# Patient Record
Sex: FEMALE | Race: OTHER | NOT HISPANIC OR LATINO
[De-identification: names, ages, dates, MRNs, and addresses within clinical notes are randomized per-mention and may not be internally consistent; named-entity substitution may affect disease eponyms.]

---

## 2017-01-09 ENCOUNTER — MEDICATION RENEWAL (OUTPATIENT)
Age: 82
End: 2017-01-09

## 2017-01-10 ENCOUNTER — APPOINTMENT (OUTPATIENT)
Dept: HOME HEALTH SERVICES | Facility: HOME HEALTH | Age: 82
End: 2017-01-10

## 2017-01-10 VITALS
TEMPERATURE: 97.8 F | DIASTOLIC BLOOD PRESSURE: 62 MMHG | OXYGEN SATURATION: 6 % | SYSTOLIC BLOOD PRESSURE: 112 MMHG | RESPIRATION RATE: 16 BRPM | HEART RATE: 86 BPM

## 2017-01-11 ENCOUNTER — MEDICATION RENEWAL (OUTPATIENT)
Age: 82
End: 2017-01-11

## 2017-01-19 ENCOUNTER — APPOINTMENT (OUTPATIENT)
Dept: HOME HEALTH SERVICES | Facility: HOME HEALTH | Age: 82
End: 2017-01-19

## 2017-01-26 ENCOUNTER — APPOINTMENT (OUTPATIENT)
Dept: HOME HEALTH SERVICES | Facility: HOME HEALTH | Age: 82
End: 2017-01-26

## 2017-01-26 DIAGNOSIS — E87.5 HYPERKALEMIA: ICD-10-CM

## 2017-01-26 DIAGNOSIS — M62.838 OTHER MUSCLE SPASM: ICD-10-CM

## 2017-01-27 ENCOUNTER — LABORATORY RESULT (OUTPATIENT)
Age: 82
End: 2017-01-27

## 2017-01-27 VITALS
SYSTOLIC BLOOD PRESSURE: 108 MMHG | HEART RATE: 78 BPM | OXYGEN SATURATION: 95 % | DIASTOLIC BLOOD PRESSURE: 62 MMHG | RESPIRATION RATE: 14 BRPM | TEMPERATURE: 98 F

## 2017-02-08 ENCOUNTER — MEDICATION RENEWAL (OUTPATIENT)
Age: 82
End: 2017-02-08

## 2017-02-17 ENCOUNTER — APPOINTMENT (OUTPATIENT)
Dept: HOME HEALTH SERVICES | Facility: HOME HEALTH | Age: 82
End: 2017-02-17

## 2017-02-17 VITALS
OXYGEN SATURATION: 96 % | TEMPERATURE: 98.8 F | SYSTOLIC BLOOD PRESSURE: 112 MMHG | HEART RATE: 82 BPM | RESPIRATION RATE: 16 BRPM | DIASTOLIC BLOOD PRESSURE: 68 MMHG

## 2017-02-21 ENCOUNTER — MEDICATION RENEWAL (OUTPATIENT)
Age: 82
End: 2017-02-21

## 2017-03-07 ENCOUNTER — MEDICATION RENEWAL (OUTPATIENT)
Age: 82
End: 2017-03-07

## 2017-03-13 ENCOUNTER — MEDICATION RENEWAL (OUTPATIENT)
Age: 82
End: 2017-03-13

## 2017-03-14 ENCOUNTER — RX RENEWAL (OUTPATIENT)
Age: 82
End: 2017-03-14

## 2017-03-27 ENCOUNTER — RX RENEWAL (OUTPATIENT)
Age: 82
End: 2017-03-27

## 2017-04-04 ENCOUNTER — MEDICATION RENEWAL (OUTPATIENT)
Age: 82
End: 2017-04-04

## 2017-04-04 DIAGNOSIS — N39.0 URINARY TRACT INFECTION, SITE NOT SPECIFIED: ICD-10-CM

## 2017-04-10 ENCOUNTER — MEDICATION RENEWAL (OUTPATIENT)
Age: 82
End: 2017-04-10

## 2017-04-20 ENCOUNTER — MEDICATION RENEWAL (OUTPATIENT)
Age: 82
End: 2017-04-20

## 2017-04-24 ENCOUNTER — MEDICATION RENEWAL (OUTPATIENT)
Age: 82
End: 2017-04-24

## 2017-05-04 ENCOUNTER — APPOINTMENT (OUTPATIENT)
Dept: HOME HEALTH SERVICES | Facility: HOME HEALTH | Age: 82
End: 2017-05-04

## 2017-05-04 VITALS
RESPIRATION RATE: 18 BRPM | SYSTOLIC BLOOD PRESSURE: 126 MMHG | OXYGEN SATURATION: 91 % | DIASTOLIC BLOOD PRESSURE: 64 MMHG | TEMPERATURE: 97.5 F | HEART RATE: 80 BPM

## 2017-05-04 DIAGNOSIS — K59.09 OTHER CONSTIPATION: ICD-10-CM

## 2017-05-04 DIAGNOSIS — R13.19 OTHER DYSPHAGIA: ICD-10-CM

## 2017-05-04 DIAGNOSIS — E03.9 HYPOTHYROIDISM, UNSPECIFIED: ICD-10-CM

## 2017-05-08 ENCOUNTER — MEDICATION RENEWAL (OUTPATIENT)
Age: 82
End: 2017-05-08

## 2017-05-15 ENCOUNTER — MEDICATION RENEWAL (OUTPATIENT)
Age: 82
End: 2017-05-15

## 2017-05-22 ENCOUNTER — MEDICATION RENEWAL (OUTPATIENT)
Age: 82
End: 2017-05-22

## 2017-06-26 ENCOUNTER — MEDICATION RENEWAL (OUTPATIENT)
Age: 82
End: 2017-06-26

## 2017-07-13 ENCOUNTER — MEDICATION RENEWAL (OUTPATIENT)
Age: 82
End: 2017-07-13

## 2017-07-24 ENCOUNTER — MEDICATION RENEWAL (OUTPATIENT)
Age: 82
End: 2017-07-24

## 2017-07-24 ENCOUNTER — CLINICAL ADVICE (OUTPATIENT)
Age: 82
End: 2017-07-24

## 2017-07-24 DIAGNOSIS — G93.41 METABOLIC ENCEPHALOPATHY: ICD-10-CM

## 2017-08-08 ENCOUNTER — CLINICAL ADVICE (OUTPATIENT)
Age: 82
End: 2017-08-08

## 2017-08-08 RX ORDER — LEVOFLOXACIN 500 MG/1
500 TABLET, FILM COATED ORAL DAILY
Qty: 7 | Refills: 0 | Status: ACTIVE | COMMUNITY
Start: 2017-04-04 | End: 1900-01-01

## 2017-08-24 ENCOUNTER — MEDICATION RENEWAL (OUTPATIENT)
Age: 82
End: 2017-08-24

## 2017-09-21 ENCOUNTER — APPOINTMENT (OUTPATIENT)
Dept: HOME HEALTH SERVICES | Facility: HOME HEALTH | Age: 82
End: 2017-09-21
Payer: MEDICARE

## 2017-09-21 VITALS
HEART RATE: 62 BPM | OXYGEN SATURATION: 90 % | RESPIRATION RATE: 16 BRPM | SYSTOLIC BLOOD PRESSURE: 112 MMHG | DIASTOLIC BLOOD PRESSURE: 60 MMHG

## 2017-09-21 DIAGNOSIS — R45.1 RESTLESSNESS AND AGITATION: ICD-10-CM

## 2017-09-21 DIAGNOSIS — J06.9 ACUTE UPPER RESPIRATORY INFECTION, UNSPECIFIED: ICD-10-CM

## 2017-09-21 DIAGNOSIS — J45.909 UNSPECIFIED ASTHMA, UNCOMPLICATED: ICD-10-CM

## 2017-09-21 DIAGNOSIS — R64 CACHEXIA: ICD-10-CM

## 2017-09-21 DIAGNOSIS — R32 UNSPECIFIED URINARY INCONTINENCE: ICD-10-CM

## 2017-09-21 DIAGNOSIS — F03.90 UNSPECIFIED DEMENTIA W/OUT BEHAVIORAL DISTURBANCE: ICD-10-CM

## 2017-09-21 PROCEDURE — 99349 HOME/RES VST EST MOD MDM 40: CPT

## 2017-09-25 PROBLEM — R32 URINARY INCONTINENCE IN FEMALE: Status: ACTIVE | Noted: 2017-09-25

## 2017-10-02 ENCOUNTER — MEDICATION RENEWAL (OUTPATIENT)
Age: 82
End: 2017-10-02

## 2017-10-16 ENCOUNTER — MEDICATION RENEWAL (OUTPATIENT)
Age: 82
End: 2017-10-16

## 2017-10-30 ENCOUNTER — MEDICATION RENEWAL (OUTPATIENT)
Age: 82
End: 2017-10-30

## 2017-10-30 RX ORDER — TRAMADOL HYDROCHLORIDE 50 MG/1
50 TABLET, COATED ORAL
Qty: 120 | Refills: 0 | Status: ACTIVE | COMMUNITY
Start: 2017-07-24 | End: 1900-01-01

## 2017-11-13 ENCOUNTER — MEDICATION RENEWAL (OUTPATIENT)
Age: 82
End: 2017-11-13

## 2017-11-13 ENCOUNTER — CLINICAL ADVICE (OUTPATIENT)
Age: 82
End: 2017-11-13

## 2017-11-28 ENCOUNTER — CLINICAL ADVICE (OUTPATIENT)
Age: 82
End: 2017-11-28

## 2017-11-28 ENCOUNTER — INPATIENT (INPATIENT)
Facility: HOSPITAL | Age: 82
LOS: 10 days | End: 2017-12-09
Attending: INTERNAL MEDICINE | Admitting: INTERNAL MEDICINE
Payer: MEDICARE

## 2017-11-28 VITALS
RESPIRATION RATE: 17 BRPM | HEART RATE: 88 BPM | DIASTOLIC BLOOD PRESSURE: 49 MMHG | OXYGEN SATURATION: 100 % | SYSTOLIC BLOOD PRESSURE: 124 MMHG | TEMPERATURE: 101 F

## 2017-11-28 LAB
ALBUMIN SERPL ELPH-MCNC: 3.7 G/DL — SIGNIFICANT CHANGE UP (ref 3.3–5)
ALP SERPL-CCNC: 98 U/L — SIGNIFICANT CHANGE UP (ref 40–120)
ALT FLD-CCNC: 20 U/L — SIGNIFICANT CHANGE UP (ref 4–33)
APPEARANCE UR: SIGNIFICANT CHANGE UP
APTT BLD: 28.6 SEC — SIGNIFICANT CHANGE UP (ref 27.5–37.4)
AST SERPL-CCNC: 18 U/L — SIGNIFICANT CHANGE UP (ref 4–32)
B PERT DNA SPEC QL NAA+PROBE: SIGNIFICANT CHANGE UP
BACTERIA # UR AUTO: HIGH
BASE EXCESS BLDV CALC-SCNC: -12.3 MMOL/L — SIGNIFICANT CHANGE UP
BASOPHILS NFR BLD AUTO: 0.2 % — SIGNIFICANT CHANGE UP (ref 0–2)
BILIRUB SERPL-MCNC: 0.3 MG/DL — SIGNIFICANT CHANGE UP (ref 0.2–1.2)
BILIRUB UR-MCNC: NEGATIVE — SIGNIFICANT CHANGE UP
BLOOD GAS VENOUS - CREATININE: 3.14 MG/DL — HIGH (ref 0.5–1.3)
BLOOD UR QL VISUAL: HIGH
BUN SERPL-MCNC: 149 MG/DL — HIGH (ref 7–23)
C PNEUM DNA SPEC QL NAA+PROBE: NOT DETECTED — SIGNIFICANT CHANGE UP
CALCIUM SERPL-MCNC: 8.9 MG/DL — SIGNIFICANT CHANGE UP (ref 8.4–10.5)
CHLORIDE BLDV-SCNC: 111 MMOL/L — HIGH (ref 96–108)
CHLORIDE SERPL-SCNC: 104 MMOL/L — SIGNIFICANT CHANGE UP (ref 98–107)
CK MB BLD-MCNC: 5.69 NG/ML — HIGH (ref 1–4.7)
CK SERPL-CCNC: 101 U/L — SIGNIFICANT CHANGE UP (ref 25–170)
CO2 SERPL-SCNC: 12 MMOL/L — LOW (ref 22–31)
COLOR SPEC: SIGNIFICANT CHANGE UP
CREAT SERPL-MCNC: 3.07 MG/DL — HIGH (ref 0.5–1.3)
EOSINOPHIL NFR BLD AUTO: 0 % — SIGNIFICANT CHANGE UP (ref 0–6)
FLUAV H1 2009 PAND RNA SPEC QL NAA+PROBE: NOT DETECTED — SIGNIFICANT CHANGE UP
FLUAV H1 RNA SPEC QL NAA+PROBE: NOT DETECTED — SIGNIFICANT CHANGE UP
FLUAV H3 RNA SPEC QL NAA+PROBE: NOT DETECTED — SIGNIFICANT CHANGE UP
FLUAV SUBTYP SPEC NAA+PROBE: SIGNIFICANT CHANGE UP
FLUBV RNA SPEC QL NAA+PROBE: NOT DETECTED — SIGNIFICANT CHANGE UP
GAS PNL BLDV: 139 MMOL/L — SIGNIFICANT CHANGE UP (ref 136–146)
GLUCOSE BLDV-MCNC: 302 — HIGH (ref 70–99)
GLUCOSE SERPL-MCNC: 338 MG/DL — HIGH (ref 70–99)
GLUCOSE UR-MCNC: 100 — SIGNIFICANT CHANGE UP
HADV DNA SPEC QL NAA+PROBE: NOT DETECTED — SIGNIFICANT CHANGE UP
HCO3 BLDV-SCNC: 15 MMOL/L — LOW (ref 20–27)
HCOV 229E RNA SPEC QL NAA+PROBE: NOT DETECTED — SIGNIFICANT CHANGE UP
HCOV HKU1 RNA SPEC QL NAA+PROBE: NOT DETECTED — SIGNIFICANT CHANGE UP
HCOV NL63 RNA SPEC QL NAA+PROBE: NOT DETECTED — SIGNIFICANT CHANGE UP
HCOV OC43 RNA SPEC QL NAA+PROBE: NOT DETECTED — SIGNIFICANT CHANGE UP
HCT VFR BLD CALC: 29.9 % — LOW (ref 34.5–45)
HCT VFR BLDV CALC: 30.6 % — LOW (ref 34.5–45)
HGB BLD-MCNC: 10.1 G/DL — LOW (ref 11.5–15.5)
HGB BLDV-MCNC: 9.9 G/DL — LOW (ref 11.5–15.5)
HMPV RNA SPEC QL NAA+PROBE: NOT DETECTED — SIGNIFICANT CHANGE UP
HPIV1 RNA SPEC QL NAA+PROBE: NOT DETECTED — SIGNIFICANT CHANGE UP
HPIV2 RNA SPEC QL NAA+PROBE: NOT DETECTED — SIGNIFICANT CHANGE UP
HPIV3 RNA SPEC QL NAA+PROBE: NOT DETECTED — SIGNIFICANT CHANGE UP
HPIV4 RNA SPEC QL NAA+PROBE: NOT DETECTED — SIGNIFICANT CHANGE UP
IMM GRANULOCYTES NFR BLD AUTO: 1.2 % — SIGNIFICANT CHANGE UP (ref 0–1.5)
INR BLD: 1.12 — SIGNIFICANT CHANGE UP (ref 0.88–1.17)
KETONES UR-MCNC: NEGATIVE — SIGNIFICANT CHANGE UP
LACTATE BLDV-MCNC: 1.8 MMOL/L — SIGNIFICANT CHANGE UP (ref 0.5–2)
LEUKOCYTE ESTERASE UR-ACNC: HIGH
LYMPHOCYTES # BLD AUTO: 1.5 % — LOW (ref 13–44)
M PNEUMO DNA SPEC QL NAA+PROBE: NOT DETECTED — SIGNIFICANT CHANGE UP
MCHC RBC-ENTMCNC: 32.9 PG — SIGNIFICANT CHANGE UP (ref 27–34)
MCHC RBC-ENTMCNC: 33.8 % — SIGNIFICANT CHANGE UP (ref 32–36)
MCV RBC AUTO: 97.4 FL — SIGNIFICANT CHANGE UP (ref 80–100)
MONOCYTES NFR BLD AUTO: 6.1 % — SIGNIFICANT CHANGE UP (ref 2–14)
MUCOUS THREADS # UR AUTO: SIGNIFICANT CHANGE UP
NEUTROPHILS NFR BLD AUTO: 91 % — HIGH (ref 43–77)
NITRITE UR-MCNC: NEGATIVE — SIGNIFICANT CHANGE UP
PCO2 BLDV: 31 MMHG — LOW (ref 41–51)
PH BLDV: 7.26 PH — LOW (ref 7.32–7.43)
PH UR: 5.5 — SIGNIFICANT CHANGE UP (ref 4.6–8)
PLATELET # BLD AUTO: 172 K/UL — SIGNIFICANT CHANGE UP (ref 150–400)
PMV BLD: 12.6 FL — SIGNIFICANT CHANGE UP (ref 7–13)
PO2 BLDV: 53 MMHG — HIGH (ref 35–40)
POTASSIUM BLDV-SCNC: 4.1 MMOL/L — SIGNIFICANT CHANGE UP (ref 3.4–4.5)
POTASSIUM SERPL-MCNC: 4.2 MMOL/L — SIGNIFICANT CHANGE UP (ref 3.5–5.3)
POTASSIUM SERPL-SCNC: 4.2 MMOL/L — SIGNIFICANT CHANGE UP (ref 3.5–5.3)
PROT SERPL-MCNC: 7.7 G/DL — SIGNIFICANT CHANGE UP (ref 6–8.3)
PROT UR-MCNC: 100 — HIGH
PROTHROM AB SERPL-ACNC: 12.6 SEC — SIGNIFICANT CHANGE UP (ref 9.8–13.1)
RBC # BLD: 3.07 M/UL — LOW (ref 3.8–5.2)
RBC # FLD: 13.5 % — SIGNIFICANT CHANGE UP (ref 10.3–14.5)
RSV RNA SPEC QL NAA+PROBE: NOT DETECTED — SIGNIFICANT CHANGE UP
RV+EV RNA SPEC QL NAA+PROBE: NOT DETECTED — SIGNIFICANT CHANGE UP
SAO2 % BLDV: 80.5 % — SIGNIFICANT CHANGE UP (ref 60–85)
SODIUM SERPL-SCNC: 141 MMOL/L — SIGNIFICANT CHANGE UP (ref 135–145)
SP GR SPEC: 1.01 — SIGNIFICANT CHANGE UP (ref 1–1.03)
TROPONIN T SERPL-MCNC: 0.2 NG/ML — HIGH (ref 0–0.06)
UROBILINOGEN FLD QL: NORMAL E.U. — SIGNIFICANT CHANGE UP (ref 0.1–0.2)
WBC # BLD: 26.27 K/UL — HIGH (ref 3.8–10.5)
WBC # FLD AUTO: 26.27 K/UL — HIGH (ref 3.8–10.5)
WBC UR QL: >50 — HIGH (ref 0–?)

## 2017-11-28 PROCEDURE — 70490 CT SOFT TISSUE NECK W/O DYE: CPT | Mod: 26

## 2017-11-28 PROCEDURE — 71010: CPT | Mod: 26

## 2017-11-28 RX ORDER — SODIUM CHLORIDE 9 MG/ML
1000 INJECTION INTRAMUSCULAR; INTRAVENOUS; SUBCUTANEOUS ONCE
Qty: 0 | Refills: 0 | Status: COMPLETED | OUTPATIENT
Start: 2017-11-28 | End: 2017-11-28

## 2017-11-28 RX ORDER — AZTREONAM 2 G
1000 VIAL (EA) INJECTION ONCE
Qty: 0 | Refills: 0 | Status: DISCONTINUED | OUTPATIENT
Start: 2017-11-28 | End: 2017-11-28

## 2017-11-28 RX ORDER — VANCOMYCIN HCL 1 G
1000 VIAL (EA) INTRAVENOUS ONCE
Qty: 0 | Refills: 0 | Status: COMPLETED | OUTPATIENT
Start: 2017-11-28 | End: 2017-11-28

## 2017-11-28 RX ORDER — ACETAMINOPHEN 500 MG
1000 TABLET ORAL ONCE
Qty: 0 | Refills: 0 | Status: COMPLETED | OUTPATIENT
Start: 2017-11-28 | End: 2017-11-28

## 2017-11-28 RX ORDER — IMIPENEM AND CILASTATIN 250; 250 MG/100ML; MG/100ML
500 INJECTION, POWDER, FOR SOLUTION INTRAVENOUS ONCE
Qty: 0 | Refills: 0 | Status: COMPLETED | OUTPATIENT
Start: 2017-11-28 | End: 2017-11-28

## 2017-11-28 RX ORDER — LEVOFLOXACIN 500 MG/1
500 TABLET, FILM COATED ORAL DAILY
Qty: 5 | Refills: 0 | Status: ACTIVE | COMMUNITY
Start: 2017-11-28 | End: 1900-01-01

## 2017-11-28 RX ORDER — MEROPENEM 1 G/30ML
1000 INJECTION INTRAVENOUS ONCE
Qty: 0 | Refills: 0 | Status: DISCONTINUED | OUTPATIENT
Start: 2017-11-28 | End: 2017-11-28

## 2017-11-28 RX ORDER — DEXAMETHASONE 0.5 MG/5ML
10 ELIXIR ORAL ONCE
Qty: 0 | Refills: 0 | Status: COMPLETED | OUTPATIENT
Start: 2017-11-28 | End: 2017-11-28

## 2017-11-28 RX ADMIN — SODIUM CHLORIDE 1000 MILLILITER(S): 9 INJECTION INTRAMUSCULAR; INTRAVENOUS; SUBCUTANEOUS at 23:27

## 2017-11-28 RX ADMIN — Medication 250 MILLIGRAM(S): at 17:32

## 2017-11-28 RX ADMIN — SODIUM CHLORIDE 1000 MILLILITER(S): 9 INJECTION INTRAMUSCULAR; INTRAVENOUS; SUBCUTANEOUS at 17:25

## 2017-11-28 RX ADMIN — Medication 400 MILLIGRAM(S): at 16:26

## 2017-11-28 RX ADMIN — IMIPENEM AND CILASTATIN 100 MILLIGRAM(S): 250; 250 INJECTION, POWDER, FOR SOLUTION INTRAVENOUS at 17:00

## 2017-11-28 RX ADMIN — Medication 102 MILLIGRAM(S): at 23:26

## 2017-11-28 NOTE — ED ADULT TRIAGE NOTE - CHIEF COMPLAINT QUOTE
Pt BIBEMS as notification for increased lethargy, responsive only to pain, and swelling to L face. Pt febrile on arrival. Direct to room.

## 2017-11-28 NOTE — ED ADULT NURSE NOTE - ED STAT RN HANDOFF DETAILS
report given to SICU RN, Marlen, pt in NAD, awaiting EDT and float nurse for transport, will continue to monitor pt.

## 2017-11-28 NOTE — ED PROVIDER NOTE - MEDICAL DECISION MAKING DETAILS
89yo female with decreased responsiveness and sepsis, labs, cx, abx, admit 89f, h/o dementia, nonverbal at baseline, dx with uti as outpatient, febrile, new left facial swelling, will check labs, cxr, ua, fluids, abx, ct, reass.

## 2017-11-28 NOTE — ED PROVIDER NOTE - SKIN, MLM
Skin normal color for race, warm, dry and intact.left sided facial swelling, redness, induration and warmth

## 2017-11-28 NOTE — CONSULT NOTE ADULT - ASSESSMENT
89F with possible Sepsis secondary to UTI and Left Parotid/Submandibular swelling with paratracheal and possible epiglottic involvement, PNA      Recommendations:  -no indication for ICU level of care at this time. patient is hemodynamically stable, respiratory status is stable.  -CT neck and CXR noted  -UA suggestive of UTI  -ENT eval for possible epiglottitis   -treat underlying infections with broad spectrum abx; pt received IV vanco and imipenem in ED  -IVF hydration  -Decadron   -supplemental O2 prn  -f/u all cultures  -d/w Dr. Montiel and Dr. Newell

## 2017-11-28 NOTE — ED ADULT NURSE REASSESSMENT NOTE - NS ED NURSE REASSESS COMMENT FT1
report received at shift change, pt remains at baseline mental status, nonverbal, family remains at bedside, updated by MD with CT read and plan, pt awaiting MICU/ENT consult. VS as noted, pt in NAD, will continue to monitor pt.

## 2017-11-28 NOTE — ED PROVIDER NOTE - PMH
Breast cancer    CKD (chronic kidney disease)    Dementia    Hypothyroid    UTI (urinary tract infection) due to Enterococcus

## 2017-11-28 NOTE — CONSULT NOTE ADULT - ATTENDING COMMENTS
patient seen and examined at bedside. Pt with hx severe dementia, nonverbal and nonmobile,  presents with facial swelling on the left, pos. ua and opacity on cxr . ct scan of  face reviewed,  pt with tissue swelling, mild epiglottis involvement and or posterior effusion, On 4 liters nasal  cannula o2 sat 100%, bp 120/80p 90 rr18 heent dry mucosa lungs clear, heart s1s2  abdomen nontender,  labs noted  likely sepsis  Pt able to protect airway, suggest ent evaluation, decadron, iv abx    pt does not require icu level care, patient seen and examined at bedside. Pt with hx severe dementia, nonverbal and nonmobile,  presents with facial swelling on the left, pos. ua and opacity on cxr . ct scan of  face reviewed,  pt with tissue swelling, mild epiglottis involvement and or posterior effusion, On 4 liters nasal  cannula o2 sat 100%, bp 120/80p 90 rr18 heent dry mucosa lungs clear, heart s1s2  abdomen nontender,  labs noted  likely sepsis  pt on nasal cannula, , decadron, iv abx    pt does not require medical icu  .  suggest  ENT evaluation for possible drainage .

## 2017-11-28 NOTE — ED PROVIDER NOTE - CARE PLAN
Principal Discharge DX:	Supraglottic edema Principal Discharge DX:	Supraglottic edema  Secondary Diagnosis:	Urinary tract infection without hematuria, site unspecified

## 2017-11-28 NOTE — ED ADULT NURSE NOTE - OBJECTIVE STATEMENT
Pt rcvd to Tr A for increased lethargy, L facial swelling, & fever. As per EMS and Aide, pt is confused at baseline, but today was more lethargic, responsive only to painful stimuli and febrile. Family also began to notice L facial swelling. + edema observed to L face, area warm to touch & reddened. Aide states a hx of UTI, currently on antibx at home. Pt is contracted, multiple small hematomas observed to L arm. Arrives with 20g IV to L forearm by EMS, given 1L NS by EMS en route. 20g IV placed to R wrist in ED. Blanchable redness observed to sacral area. Evaluated by MD. Labs drawn & sent. Will continue to monitor.

## 2017-11-28 NOTE — ED PROVIDER NOTE - PROGRESS NOTE DETAILS
not micu candidate, awaiting ENT eval shashi: seen by ent, concern for airway swelling, will consult SICU [place]: Pt slightly tachypneic w/ stridor. satting 100%. pt at baseline non-communicative. daugthers at bedside, they state pt appears the same as over the last hr and not worse. SICU at bedside as well. Will reassess,

## 2017-11-28 NOTE — ED PROVIDER NOTE - OBJECTIVE STATEMENT
89yo F h/o bedbound, endstage dementia, CKD (baseline 1.75) hypothyroidism presents from home with decreased responsiveness, fever and left sided facial swelling. Pt was dxed with UTI yesterday and started on levaquin. Also with cough today.   Pt brought to trauma A as notification. On arrival pt nonverbal but responsive to painful stimuli, protecting airway.

## 2017-11-28 NOTE — ED PROVIDER NOTE - ATTENDING CONTRIBUTION TO CARE
[facility]- 89 y/o f, h/o ckd, hypothyroid, dementia (nonverbal/bedbound at baseline) presents for left facial swelling, fevers, just started levaquin for uti. Per daughter (daisy who I spoke with via phone) and HHA at bedside, patient was dx with uti and started on levaquin for treatment. Today they noticed the left side of her face was becoming progressively more swollen and red. +fevers at home. No respiratory distress noted by aid. MS currently at baseline per hha though was less responsive earlier.  exam  GEN - eyes closed, intermittently makes high pitched repetitive sounds; ao0, nonverbal, responsive to tactile stimuli  EYES- clear conjunctiva  ENT: dry mm, limited view of posterior ooropharynx as patient not able to cooperate with exam but no obvious lesions noted, poor dentition but no region of fluctuance or drainage apparent,  +edema/erythema extending from preauricular region of left face/cheek/upper neck, no focal region of fluctuance. No drooling noted, protecting airway.  NECK: supple  PULMONARY - symmetric clear breath sounds.   CARDIAC -s1s2, RRR, no M,G,R  ABDOMEN - +BS, ND, soft, no guarding, no masses  BACK - Normal  spine   EXTREMITIES - contracted extremities, capillary refill < 2 seconds, no edema   SKIN - no rash   NEUROLOGIC - ao0, intermittently makes high pitched noises, does not follow commands. (baseline per aid  a/p-89f, h/o dementia, ckd, hypothyroid, recently diagnosed with uti started on levaquin, now with left facial swelling, appears to be protecting airway right now, ms @ baseline per hha, febrile, will check labs, cxr, ua, ct eval of left facial swelling (will likely be noncon pending cr results given h/o ckd), hydrate, broad spectrum abx coverage, careful observation, and reass.  I had a phone conversation with daughter Daisy who per hha and herself is the patients hcp, as per the molst form, daisy reaffirmed that she would like everything to be done for the patient and that she is to be full code.

## 2017-11-28 NOTE — CONSULT NOTE ADULT - SUBJECTIVE AND OBJECTIVE BOX
CHIEF COMPLAINT: fever and left facial swelling    HPI: 89F with end stage dementia, CKD Stage 3 (baseline Cr 1.75), hypothyroidism, frequent UTIs p/w left facial swelling since this AM. Daughter at bedside provides hx. She noted patient felt warm today and left her left cheek was swollen. Patient had eaten today, otherwise seemed fine, but les responsive later in the day. Patient was recently dx with UTI yesterday and started on levafloxacin. Patient unable to contribute to HPI secondary to dementia. At baseline patient is mainly non-verbal (will make high pitched sounds when in pain), keeps eyes closed, contracted and bed bound, lives with daughter.     PAST MEDICAL & SURGICAL HISTORY:  UTI (urinary tract infection) due to Enterococcus  CKD (chronic kidney disease)  Breast cancer  Hypothyroid  Dementia      FAMILY HISTORY:  No pertinent family history in first degree relatives      SOCIAL HISTORY:  Smoking: [ ] Never Smoked [ ] Former Smoker (__ packs x ___ years) [ ] Current Smoker  (__ packs x ___ years) [x] unknown  Substance Use: [ ] Never Used [ ] Used ____  EtOH Use:  Marital Status: [ ] Single [ ]  [ ]  [ ]       Allergies: No Known Allergies    Intolerances      HOME MEDICATIONS:  Home Medications:  ciprofloxacin 250 mg oral tablet: 1 tab(s) orally 2 times a day (18 Sep 2015 17:27)  citalopram 40 mg oral tablet: 1 tab(s) orally once a day (14 Sep 2015 15:10)  Colace sodium 100 mg oral capsule: 2 cap(s) orally 2 times a day (14 Sep 2015 15:10)  folic acid 1 mg oral tablet: 1 tab(s) orally once a day (18 Sep 2015 17:27)  lactulose 10 g oral powder for reconstitution: 1 dose(s) orally once a day (18 Sep 2015 18:03)  levalbuterol 1.25 mg/3 mL inhalation solution: 3 milliliter(s) inhaled 2 times a day, As Needed (19 Sep 2015 08:59)  LORazepam 0.5 mg oral tablet: 1 tab(s) orally 3 times a day, As Needed (14 Sep 2015 15:10)  Namenda 10 mg oral tablet: 1 tab(s) orally 2 times a day (14 Sep 2015 15:10)  polyethylene glycol 3350 oral powder for reconstitution: 17 gram(s) orally 2 times a day (14 Sep 2015 15:10)  senna oral tablet: 2 tab(s) orally once a day (at bedtime) (14 Sep 2015 15:10)  Synthroid 75 mcg (0.075 mg) oral tablet: 1 tab(s) orally once a day (14 Sep 2015 15:10)  Tylenol 500 mg oral tablet: 2 tab(s) orally every 6 hours, As Needed (14 Sep 2015 15:10)      REVIEW OF SYSTEMS:  Constitutional: [ ] negative [ ] fevers [ ] chills [ ] weight loss [ ] weight gain  HEENT: [ ] negative [ ] dry eyes [ ] eye irritation [ ] postnasal drip [ ] nasal congestion  CV: [ ] negative  [ ] chest pain [ ] orthopnea [ ] palpitations [ ] murmur  Resp: [ ] negative [ ] cough [ ] shortness of breath [ ] dyspnea [ ] wheezing [ ] sputum [ ] hemoptysis  GI: [ ] negative [ ] nausea [ ] vomiting [ ] diarrhea [ ] constipation [ ] abd pain [ ] dysphagia   : [ ] negative [ ] dysuria [ ] nocturia [ ] hematuria [ ] increased urinary frequency  Musculoskeletal: [ ] negative [ ] back pain [ ] myalgias [ ] arthralgias [ ] fracture  Skin: [ ] negative [ ] rash [ ] itch  Neurological: [ ] negative [ ] headache [ ] dizziness [ ] syncope [ ] weakness [ ] numbness  Psychiatric: [ ] negative [ ] anxiety [ ] depression  Endocrine: [ ] negative [ ] diabetes [ ] thyroid problem  Hematologic/Lymphatic: [ ] negative [ ] anemia [ ] bleeding problem  Allergic/Immunologic: [ ] negative [ ] itchy eyes [ ] nasal discharge [ ] hives [ ] angioedema  [ ] All other systems negative  [x ] Unable to assess ROS secondary to dementia    OBJECTIVE:  T(C): 37.8 (2017 21:45), Max: 38.1 (2017 16:17)  T(F): 100.1 (2017 21:45), Max: 100.5 (2017 16:17)  HR: 91 (2017 21:45) (82 - 91)  BP: 110/47 (2017 21:45) (110/47 - 124/49)  BP(mean): 60 (2017 21:45) (60 - 60)  ABP: --  ABP(mean): --  RR: 16 (2017 21:45) (15 - 17)  SpO2: 99% (2017 21:45) (99% - 100%)        PHYSICAL EXAM:   General: NAD, resting comfortably with eyes closed, contracted extremities  HEENT: eyes closed, does not allow to open. Dry oral mucosa. left facial swelling over lower face with erythema and tenderness to palpation  Lymph Nodes:  Neck: supple  Respiratory: lungs clear to ausculation bilaterally. Good air entry. no WRR  Cardiovascular: (+) S1S2, (+) systolic murmur grade 3/6, regular rhythm  Abdomen: soft, distended, (+) BS  Extremities: contracted hands b/l and RLE. no edema noted  Skin: facial cellulitis as above  Neurological: AAOx0, no purposeful interaction  Psychiatry: unable to assess    LINES: [facility] MEDICATIONS:  Standing Meds:  sodium chloride 0.9% Bolus 1000 milliLiter(s) IV Bolus once      PRN Meds:      LABS:                        10.1   26.27 )-----------( 172      ( 2017 16:06 )             29.9     Hgb Trend: 10.1<--      141  |  104  |  149<H>  ----------------------------<  338<H>  4.2   |  12<L>  |  3.07<H>    Ca    8.9      2017 16:06    TPro  7.7  /  Alb  3.7  /  TBili  0.3  /  DBili  x   /  AST  18  /  ALT  20  /  AlkPhos  98      Creatinine Trend: 3.07<--  PT/INR - ( 2017 16:06 )   PT: 12.6 SEC;   INR: 1.12          PTT - ( 2017 16:06 )  PTT:28.6 SEC  Urinalysis Basic - ( 2017 16:53 )    Color: PLYEL / Appearance: HAZY / S.013 / pH: 5.5  Gluc: 100 / Ketone: NEGATIVE  / Bili: NEGATIVE / Urobili: NORMAL E.U.   Blood: SMALL / Protein: 100 / Nitrite: NEGATIVE   Leuk Esterase: LARGE / RBC: x / WBC >50   Sq Epi: x / Non Sq Epi: x / Bacteria: MANY        Venous Blood Gas:   @ 16:06  7.26/31/53/15/80.5  VBG Lactate: 1.8      MICROBIOLOGY:   pending cx      RADIOLOGY:  [x ] Reviewed and interpreted by me    EKG: AYANNA HOROWITZ

## 2017-11-29 ENCOUNTER — APPOINTMENT (OUTPATIENT)
Dept: HOME HEALTH SERVICES | Facility: HOME HEALTH | Age: 82
End: 2017-11-29

## 2017-11-29 DIAGNOSIS — J38.4 EDEMA OF LARYNX: ICD-10-CM

## 2017-11-29 LAB
BASE EXCESS BLDA CALC-SCNC: -14 MMOL/L — SIGNIFICANT CHANGE UP
BUN SERPL-MCNC: 132 MG/DL — HIGH (ref 7–23)
BUN SERPL-MCNC: 137 MG/DL — HIGH (ref 7–23)
CA-I BLDA-SCNC: 1.34 MMOL/L — HIGH (ref 1.15–1.29)
CALCIUM SERPL-MCNC: 10.9 MG/DL — HIGH (ref 8.4–10.5)
CALCIUM SERPL-MCNC: 9.8 MG/DL — SIGNIFICANT CHANGE UP (ref 8.4–10.5)
CHLORIDE SERPL-SCNC: 112 MMOL/L — HIGH (ref 98–107)
CHLORIDE SERPL-SCNC: 112 MMOL/L — HIGH (ref 98–107)
CO2 SERPL-SCNC: 11 MMOL/L — LOW (ref 22–31)
CO2 SERPL-SCNC: SIGNIFICANT CHANGE UP MMOL/L (ref 22–31)
CREAT SERPL-MCNC: 2.56 MG/DL — HIGH (ref 0.5–1.3)
CREAT SERPL-MCNC: 2.59 MG/DL — HIGH (ref 0.5–1.3)
GLUCOSE BLDA-MCNC: 180 MG/DL — HIGH (ref 70–99)
GLUCOSE SERPL-MCNC: 152 MG/DL — HIGH (ref 70–99)
GLUCOSE SERPL-MCNC: 189 MG/DL — HIGH (ref 70–99)
HCO3 BLDA-SCNC: 14 MMOL/L — LOW (ref 22–26)
HCT VFR BLD CALC: 28 % — LOW (ref 34.5–45)
HCT VFR BLDA CALC: 28.5 % — LOW (ref 34.5–46.5)
HGB BLD-MCNC: 9.7 G/DL — LOW (ref 11.5–15.5)
HGB BLDA-MCNC: 9.2 G/DL — LOW (ref 11.5–15.5)
LACTATE BLDA-SCNC: 1 MMOL/L — SIGNIFICANT CHANGE UP (ref 0.5–2)
MAGNESIUM SERPL-MCNC: 2.7 MG/DL — HIGH (ref 1.6–2.6)
MCHC RBC-ENTMCNC: 32.9 PG — SIGNIFICANT CHANGE UP (ref 27–34)
MCHC RBC-ENTMCNC: 34.6 % — SIGNIFICANT CHANGE UP (ref 32–36)
MCV RBC AUTO: 94.9 FL — SIGNIFICANT CHANGE UP (ref 80–100)
METHOD TYPE: SIGNIFICANT CHANGE UP
NRBC # FLD: 0 — SIGNIFICANT CHANGE UP
ORGANISM # SPEC MICROSCOPIC CNT: SIGNIFICANT CHANGE UP
ORGANISM # SPEC MICROSCOPIC CNT: SIGNIFICANT CHANGE UP
PCO2 BLDA: 29 MMHG — LOW (ref 32–48)
PH BLDA: 7.24 PH — LOW (ref 7.35–7.45)
PHOSPHATE SERPL-MCNC: 8.3 MG/DL — HIGH (ref 2.5–4.5)
PLATELET # BLD AUTO: 148 K/UL — LOW (ref 150–400)
PMV BLD: 13.2 FL — HIGH (ref 7–13)
PO2 BLDA: 94 MMHG — SIGNIFICANT CHANGE UP (ref 83–108)
POTASSIUM BLDA-SCNC: 3.4 MMOL/L — SIGNIFICANT CHANGE UP (ref 3.4–4.5)
POTASSIUM SERPL-MCNC: 4 MMOL/L — SIGNIFICANT CHANGE UP (ref 3.5–5.3)
POTASSIUM SERPL-MCNC: 4.7 MMOL/L — SIGNIFICANT CHANGE UP (ref 3.5–5.3)
POTASSIUM SERPL-SCNC: 4 MMOL/L — SIGNIFICANT CHANGE UP (ref 3.5–5.3)
POTASSIUM SERPL-SCNC: 4.7 MMOL/L — SIGNIFICANT CHANGE UP (ref 3.5–5.3)
RBC # BLD: 2.95 M/UL — LOW (ref 3.8–5.2)
RBC # FLD: 13.6 % — SIGNIFICANT CHANGE UP (ref 10.3–14.5)
SAO2 % BLDA: 94.6 % — LOW (ref 95–99)
SODIUM BLDA-SCNC: 142 MMOL/L — SIGNIFICANT CHANGE UP (ref 136–146)
SODIUM SERPL-SCNC: 144 MMOL/L — SIGNIFICANT CHANGE UP (ref 135–145)
SODIUM SERPL-SCNC: 145 MMOL/L — SIGNIFICANT CHANGE UP (ref 135–145)
SPECIMEN SOURCE: SIGNIFICANT CHANGE UP
VANCOMYCIN FLD-MCNC: 7.9 UG/ML — SIGNIFICANT CHANGE UP
WBC # BLD: 24.56 K/UL — HIGH (ref 3.8–10.5)
WBC # FLD AUTO: 24.56 K/UL — HIGH (ref 3.8–10.5)

## 2017-11-29 PROCEDURE — 99291 CRITICAL CARE FIRST HOUR: CPT

## 2017-11-29 PROCEDURE — 71010: CPT | Mod: 26

## 2017-11-29 RX ORDER — DEXAMETHASONE 0.5 MG/5ML
5 ELIXIR ORAL EVERY 6 HOURS
Qty: 0 | Refills: 0 | Status: DISCONTINUED | OUTPATIENT
Start: 2017-11-29 | End: 2017-12-01

## 2017-11-29 RX ORDER — INFLUENZA VIRUS VACCINE 15; 15; 15; 15 UG/.5ML; UG/.5ML; UG/.5ML; UG/.5ML
0.5 SUSPENSION INTRAMUSCULAR ONCE
Qty: 0 | Refills: 0 | Status: DISCONTINUED | OUTPATIENT
Start: 2017-11-29 | End: 2017-12-09

## 2017-11-29 RX ORDER — INSULIN LISPRO 100/ML
VIAL (ML) SUBCUTANEOUS AT BEDTIME
Qty: 0 | Refills: 0 | Status: DISCONTINUED | OUTPATIENT
Start: 2017-11-29 | End: 2017-11-30

## 2017-11-29 RX ORDER — ACETAMINOPHEN 500 MG
650 TABLET ORAL EVERY 6 HOURS
Qty: 0 | Refills: 0 | Status: DISCONTINUED | OUTPATIENT
Start: 2017-11-29 | End: 2017-11-29

## 2017-11-29 RX ORDER — SODIUM CHLORIDE 9 MG/ML
1000 INJECTION, SOLUTION INTRAVENOUS
Qty: 0 | Refills: 0 | Status: DISCONTINUED | OUTPATIENT
Start: 2017-11-29 | End: 2017-11-29

## 2017-11-29 RX ORDER — DOCUSATE SODIUM 100 MG
200 CAPSULE ORAL
Qty: 0 | Refills: 0 | Status: DISCONTINUED | OUTPATIENT
Start: 2017-11-29 | End: 2017-11-29

## 2017-11-29 RX ORDER — DEXTROSE 50 % IN WATER 50 %
12.5 SYRINGE (ML) INTRAVENOUS ONCE
Qty: 0 | Refills: 0 | Status: DISCONTINUED | OUTPATIENT
Start: 2017-11-29 | End: 2017-12-09

## 2017-11-29 RX ORDER — SENNA PLUS 8.6 MG/1
2 TABLET ORAL AT BEDTIME
Qty: 0 | Refills: 0 | Status: DISCONTINUED | OUTPATIENT
Start: 2017-11-29 | End: 2017-11-29

## 2017-11-29 RX ORDER — DEXTROSE 50 % IN WATER 50 %
1 SYRINGE (ML) INTRAVENOUS ONCE
Qty: 0 | Refills: 0 | Status: DISCONTINUED | OUTPATIENT
Start: 2017-11-29 | End: 2017-12-01

## 2017-11-29 RX ORDER — OXYMETAZOLINE HYDROCHLORIDE 0.5 MG/ML
1 SPRAY NASAL EVERY 12 HOURS
Qty: 0 | Refills: 0 | Status: DISCONTINUED | OUTPATIENT
Start: 2017-11-29 | End: 2017-11-30

## 2017-11-29 RX ORDER — ACETAMINOPHEN 500 MG
1000 TABLET ORAL ONCE
Qty: 0 | Refills: 0 | Status: COMPLETED | OUTPATIENT
Start: 2017-11-29 | End: 2017-11-29

## 2017-11-29 RX ORDER — DEXTROSE 50 % IN WATER 50 %
25 SYRINGE (ML) INTRAVENOUS ONCE
Qty: 0 | Refills: 0 | Status: DISCONTINUED | OUTPATIENT
Start: 2017-11-29 | End: 2017-12-01

## 2017-11-29 RX ORDER — SODIUM CHLORIDE 9 MG/ML
1000 INJECTION INTRAMUSCULAR; INTRAVENOUS; SUBCUTANEOUS
Qty: 0 | Refills: 0 | Status: DISCONTINUED | OUTPATIENT
Start: 2017-11-29 | End: 2017-11-29

## 2017-11-29 RX ORDER — GLUCAGON INJECTION, SOLUTION 0.5 MG/.1ML
1 INJECTION, SOLUTION SUBCUTANEOUS ONCE
Qty: 0 | Refills: 0 | Status: DISCONTINUED | OUTPATIENT
Start: 2017-11-29 | End: 2017-12-01

## 2017-11-29 RX ORDER — LACTULOSE 10 G/15ML
10 SOLUTION ORAL DAILY
Qty: 0 | Refills: 0 | Status: DISCONTINUED | OUTPATIENT
Start: 2017-11-29 | End: 2017-11-29

## 2017-11-29 RX ORDER — MEMANTINE HYDROCHLORIDE 10 MG/1
10 TABLET ORAL
Qty: 0 | Refills: 0 | Status: DISCONTINUED | OUTPATIENT
Start: 2017-11-29 | End: 2017-11-29

## 2017-11-29 RX ORDER — VANCOMYCIN HCL 1 G
750 VIAL (EA) INTRAVENOUS ONCE
Qty: 0 | Refills: 0 | Status: COMPLETED | OUTPATIENT
Start: 2017-11-29 | End: 2017-11-29

## 2017-11-29 RX ORDER — AMPICILLIN SODIUM AND SULBACTAM SODIUM 250; 125 MG/ML; MG/ML
1.5 INJECTION, POWDER, FOR SUSPENSION INTRAMUSCULAR; INTRAVENOUS EVERY 24 HOURS
Qty: 0 | Refills: 0 | Status: DISCONTINUED | OUTPATIENT
Start: 2017-11-29 | End: 2017-11-30

## 2017-11-29 RX ORDER — CITALOPRAM 10 MG/1
40 TABLET, FILM COATED ORAL DAILY
Qty: 0 | Refills: 0 | Status: DISCONTINUED | OUTPATIENT
Start: 2017-11-29 | End: 2017-11-29

## 2017-11-29 RX ORDER — HEPARIN SODIUM 5000 [USP'U]/ML
5000 INJECTION INTRAVENOUS; SUBCUTANEOUS EVERY 12 HOURS
Qty: 0 | Refills: 0 | Status: DISCONTINUED | OUTPATIENT
Start: 2017-11-29 | End: 2017-11-30

## 2017-11-29 RX ORDER — SODIUM CHLORIDE 9 MG/ML
1000 INJECTION INTRAMUSCULAR; INTRAVENOUS; SUBCUTANEOUS
Qty: 0 | Refills: 0 | Status: DISCONTINUED | OUTPATIENT
Start: 2017-11-29 | End: 2017-11-30

## 2017-11-29 RX ORDER — LEVOTHYROXINE SODIUM 125 MCG
37 TABLET ORAL DAILY
Qty: 0 | Refills: 0 | Status: DISCONTINUED | OUTPATIENT
Start: 2017-11-29 | End: 2017-12-09

## 2017-11-29 RX ORDER — DEXAMETHASONE 0.5 MG/5ML
5 ELIXIR ORAL EVERY 6 HOURS
Qty: 0 | Refills: 0 | Status: DISCONTINUED | OUTPATIENT
Start: 2017-11-29 | End: 2017-11-29

## 2017-11-29 RX ORDER — SODIUM CHLORIDE 9 MG/ML
1000 INJECTION, SOLUTION INTRAVENOUS
Qty: 0 | Refills: 0 | Status: DISCONTINUED | OUTPATIENT
Start: 2017-11-29 | End: 2017-12-01

## 2017-11-29 RX ORDER — INSULIN LISPRO 100/ML
VIAL (ML) SUBCUTANEOUS EVERY 6 HOURS
Qty: 0 | Refills: 0 | Status: DISCONTINUED | OUTPATIENT
Start: 2017-11-29 | End: 2017-12-01

## 2017-11-29 RX ORDER — POLYETHYLENE GLYCOL 3350 17 G/17G
17 POWDER, FOR SOLUTION ORAL
Qty: 0 | Refills: 0 | Status: DISCONTINUED | OUTPATIENT
Start: 2017-11-29 | End: 2017-11-29

## 2017-11-29 RX ORDER — INSULIN LISPRO 100/ML
VIAL (ML) SUBCUTANEOUS
Qty: 0 | Refills: 0 | Status: DISCONTINUED | OUTPATIENT
Start: 2017-11-29 | End: 2017-11-29

## 2017-11-29 RX ORDER — LEVALBUTEROL 1.25 MG/.5ML
1.25 SOLUTION, CONCENTRATE RESPIRATORY (INHALATION)
Qty: 0 | Refills: 0 | Status: DISCONTINUED | OUTPATIENT
Start: 2017-11-29 | End: 2017-12-09

## 2017-11-29 RX ADMIN — Medication 400 MILLIGRAM(S): at 04:14

## 2017-11-29 RX ADMIN — Medication 5 MILLIGRAM(S): at 18:17

## 2017-11-29 RX ADMIN — HEPARIN SODIUM 5000 UNIT(S): 5000 INJECTION INTRAVENOUS; SUBCUTANEOUS at 18:17

## 2017-11-29 RX ADMIN — Medication 5 MILLIGRAM(S): at 12:28

## 2017-11-29 RX ADMIN — AMPICILLIN SODIUM AND SULBACTAM SODIUM 100 GRAM(S): 250; 125 INJECTION, POWDER, FOR SUSPENSION INTRAMUSCULAR; INTRAVENOUS at 08:37

## 2017-11-29 RX ADMIN — Medication 37 MICROGRAM(S): at 05:16

## 2017-11-29 RX ADMIN — Medication 100 MILLIGRAM(S): at 00:10

## 2017-11-29 RX ADMIN — Medication 5 MILLIGRAM(S): at 06:14

## 2017-11-29 RX ADMIN — SODIUM CHLORIDE 50 MILLILITER(S): 9 INJECTION, SOLUTION INTRAVENOUS at 08:37

## 2017-11-29 RX ADMIN — Medication 5 MILLIGRAM(S): at 23:55

## 2017-11-29 RX ADMIN — SODIUM CHLORIDE 100 MILLILITER(S): 9 INJECTION INTRAMUSCULAR; INTRAVENOUS; SUBCUTANEOUS at 20:11

## 2017-11-29 RX ADMIN — OXYMETAZOLINE HYDROCHLORIDE 1 SPRAY(S): 0.5 SPRAY NASAL at 12:32

## 2017-11-29 RX ADMIN — Medication 1: at 23:54

## 2017-11-29 RX ADMIN — SODIUM CHLORIDE 125 MILLILITER(S): 9 INJECTION INTRAMUSCULAR; INTRAVENOUS; SUBCUTANEOUS at 09:40

## 2017-11-29 RX ADMIN — HEPARIN SODIUM 5000 UNIT(S): 5000 INJECTION INTRAVENOUS; SUBCUTANEOUS at 05:15

## 2017-11-29 RX ADMIN — SODIUM CHLORIDE 30 MILLILITER(S): 9 INJECTION, SOLUTION INTRAVENOUS at 01:34

## 2017-11-29 RX ADMIN — Medication 150 MILLIGRAM(S): at 18:42

## 2017-11-29 RX ADMIN — Medication 400 MILLIGRAM(S): at 21:45

## 2017-11-29 RX ADMIN — Medication 1000 MILLIGRAM(S): at 22:15

## 2017-11-29 RX ADMIN — Medication 1: at 18:40

## 2017-11-29 NOTE — PROGRESS NOTE ADULT - SUBJECTIVE AND OBJECTIVE BOX
Patient seen and rescoped this AM at 8:10 am. Improvement in respiratory status. Breathing comfortably on NC    Phys Exam  O2 100% on NC, RR 15  NAD, non responsive  breathing comfortably, no retractions, no stridor, no stertor  left facial edema and erythema overlying the parotid, left face, and neck  NC nasal mucosa appears dry with crusting, small area of friable mucosa on left nasal septum  Poor compliance with oral exam,  Left oropharynx edema, unable to assess floor of mouth  neck with left SMG tenderness and swelling with overlying skin edema    FOE: NC/NP dry mucosa, posterior oropharynx/BOT very dry oral mucosa, supraglottis and hypopharynx coated with thick yellow secretions limiting FOE examination, epiglottis partially visualized and no significant edema or erythema appreciated, anterior TVC visualized and airway appears patent      A/P 89F with left sides parotitis/sialadenitis with diffuse surrounding inflammatory reaction including supraglottitis  - would limit nasal cannula, as nasal mucosa and upper aerodigestive tract appears very dessicated - recommend humidified face tent  - afrin BID PRN epistaxis  - NPO  - Decadron  - Airway watch  - ICU  - consider intubation if status worsens  - broad abx  - warm compresses and left sided parotid and submandibular gland massage every 2-3hrs  - aggressive hydration  - sialogogues

## 2017-11-29 NOTE — PROGRESS NOTE ADULT - SUBJECTIVE AND OBJECTIVE BOX
Pt seen and examined.  Transferred to SICU for airway watch.  Some improvement in respiratory status.    101.5, slightly tachypneic  non responsive  does not open eyes  tachypneic with inspiratory soft stridor  left facial edema and erythema overlying the parotid, left face, and neck  NC pink moist mucosa  Poor compliance with oral exam,  Left oropharynx edema, unable to assess floor of mouth  neck with left SMG tenderness and swelling with overlying skin edema      A/P 89F with left sides parotitis/sialadenitis with diffuse surrounding inflammatory reaction including supraglottitis  - NPO  - Decadron  - Airway watch  - ICU  - consider intubation if status worsens  - broad abx  - left sided parotid and submandibular gland massage every 1-2 hours  - aggressive hydration  - warm compresses  - sialogogues  - examined with attending

## 2017-11-29 NOTE — PATIENT PROFILE ADULT. - VISION (WITH CORRECTIVE LENSES IF THE PATIENT USUALLY WEARS THEM):
Severely impaired: cannot locate objects without hearing or touching them or patient nonresponsive./Pt unresponsive

## 2017-11-29 NOTE — CONSULT NOTE ADULT - SUBJECTIVE AND OBJECTIVE BOX
SICU Consultation Note  =====================================================  HPI: 89y women with PMH CKD, dementia (non verbal and bed bound), and recurrent UTIs brought in by her daughter (with whom she lives) for concern regarding facial swelling and noisy breathing which started this morning. The patient has been residing with her daughter and is cared for via the [facility] program. She was diagnosed with a UTI the day prior to admission and started on Levaquin. The night prior to the day of admission, the patient was noted to have a Tmax 101. The morning of admission the patients daughters noted erythema and swelling on the left side of the patient's face which progressively increased throughout the day. The swelling increased and the patient's breathing became increasingly stridorous. She underwent CT scan which did not show any abscesses but demonstrated diffuse inflammation of the parotid, salivary glands, and supraglottic edema. In the ED, she was evaluated by MICU who declined to take the patient. She was then evaluated by ENT who found diffuse supraglottic edema and difficulty visualizing the vocal cords.  SICU was consulted by ENT for concern for airway compromise.          PAST MEDICAL & SURGICAL HISTORY:  UTI (urinary tract infection) due to Enterococcus  CKD (chronic kidney disease)  Breast cancer  Hypothyroid  Dementia  No significant past surgical history    Home Meds:    citalopram 40 mg oral tablet: 1 tab(s) orally once a day (14 Sep 2015 15:10)  Colace sodium 100 mg oral capsule: 2 cap(s) orally 2 times a day (14 Sep 2015 15:10)  folic acid 1 mg oral tablet: 1 tab(s) orally once a day (18 Sep 2015 17:27)  lactulose 10 g oral powder for reconstitution: 1 dose(s) orally once a day (18 Sep 2015 18:03)  levalbuterol 1.25 mg/3 mL inhalation solution: 3 milliliter(s) inhaled 2 times a day, As Needed (19 Sep 2015 08:59)  LORazepam 0.5 mg oral tablet: 1 tab(s) orally 3 times a day, As Needed (14 Sep 2015 15:10)  Namenda 10 mg oral tablet: 1 tab(s) orally 2 times a day (14 Sep 2015 15:10)  polyethylene glycol 3350 oral powder for reconstitution: 17 gram(s) orally 2 times a day (14 Sep 2015 15:10)  senna oral tablet: 2 tab(s) orally once a day (at bedtime) (14 Sep 2015 15:10)  Synthroid 75 mcg (0.075 mg) oral tablet: 1 tab(s) orally once a day (14 Sep 2015 15:10)  Tylenol 500 mg oral tablet: 2 tab(s) orally every 6 hours, As Needed (14 Sep 2015 15:10)      Allergies: NKDA  Advanced Directives: After extensive discussion, the patient's daughters (HCP) want everything done for their mother including intubation, cardiovascular resuscitation, ventilatory support and all measures that would prevent her from dying    ROS:    Patient is nonverbal    CURRENT MEDICATIONS:   --------------------------------------------------------------------------------------  Neurologic Medications    Respiratory Medications    Cardiovascular Medications    Gastrointestinal Medications  lactated ringers. 1000 milliLiter(s) IV Continuous <Continuous>    Genitourinary Medications    Hematologic/Oncologic Medications  heparin  Injectable 5000 Unit(s) SubCutaneous every 12 hours    Antimicrobial/Immunologic Medications    Endocrine/Metabolic Medications  levothyroxine Injectable 37 MICROGram(s) IV Push daily    Topical/Other Medications    --------------------------------------------------------------------------------------    VITAL SIGNS, INS/OUTS (last 24 hours):  --------------------------------------------------------------------------------------  T(C): 37.8 (11-28-17 @ 21:45), Max: 38.1 (11-28-17 @ 16:17)  HR: 96 (11-29-17 @ 00:23) (82 - 96)  BP: 132/49 (11-29-17 @ 00:23) (110/47 - 132/49)  BP(mean): 60 (11-28-17 @ 21:45) (60 - 60)  ABP: --  ABP(mean): --  RR: 24 (11-29-17 @ 00:23) (15 - 24)  SpO2: 100% (11-29-17 @ 00:23) (99% - 100%)  Wt(kg): --  CVP(mm Hg): --  CI: --  CAPILLARY BLOOD GLUCOSE      --------------------------------------------------------------------------------------    EXAM:  General/Neuro  Exam: Non-responsive to verbal stimuli; withdraws to tactile stimuli. Severe b/l UE/LE contractures.  Stridorous respirations    Respiratory  Exam: Stridor on exam, no retractions but increased work of breathing noted.  NC in place.    Cardiovascular  Exam: S1, S2.  Regular rate and rhythm.   Cardiac Rhythm: Normal Sinus Rhythm      GI  Exam: Abdomen soft, Non-tender, Non-distended.   Current Diet:  NPO      Tubes/Lines/Drains    [x] Peripheral IV  [] Central Venous Line     	[] R	[] L	[] IJ	[] Fem	[] SC        Type:	    Date Placed:   [] Arterial Line		[] R	[] L	[] Fem	[] Rad	[] Ax	Date Placed:   [] PICC:         	[] Midline		[] Mediport           [] Urinary Catheter		Date Placed:     Extremities  Exam: Extremities contracted, warm, pink, well-perfused.        Derm:  Exam: Good skin turgor, no skin breakdown.      :   Exam: Incontinent of urine    LABS  --------------------------------------------------------------------------------------  CBC (11-28 @ 16:06)                              10.1<L>                         26.27<H>  )----------------(  172        91.0<H>% Neutrophils, 1.5<L>% Lymphocytes, ANC: --                                  29.9<L>                BMP (11-28 @ 16:06)             141     |  104     |  149<H>		Ca++ --      Ca 8.9                ---------------------------------( 338<H>		Mg --                 4.2     |  12<L>   |  3.07<H>			Ph --        LFTs (11-28 @ 16:06)      TPro 7.7 / Alb 3.7 / TBili 0.3 / DBili -- / AST 18 / ALT 20 / AlkPhos 98    Coags (11-28 @ 16:06)  aPTT 28.6 / INR 1.12 / PT 12.6    ABG (11-29 @ 01:00)     7.24<L> / 29<L> / 94 / 14<L> / -14.0 / 94.6<L>%     Lactate: 1.0    ABG (11-28 @ 16:06)      /  /  /  /  / %     Lactate:   1.8    VBG (11-28 @ 16:06)     7.26<L> / 31<L> / 53<H> / 15<L> / -12.3 / 80.5%      --------------------------------------------------------------------------------------    OTHER LABS    IMAGING RESULTS      ASSESSMENT:  89y Female w/ upper airway swelling concerning for potential airway compromise, likely 2/2 parotitis, also with UTI    PLAN:   Neurologic: Continue with home namenda and citalopram.  Avoid analgesics with the potential to cause respiratory depression  Respiratory: Continue to monitor on 2L nasal cannula. Low threshold for intubation if airway status declines.   Cardiovascular: No active issues, will continue to monitor  Gastrointestinal/Nutrition: NPO for now in setting of possible need for intubation.   Renal/Genitourinary: Continue with Levaquin to treat UTI  Hematologic: SQH for DVT ppx  Infectious Disease: Leukocytosis likely 2/2 UTI and parotitis. Continue with levaquin.    Lines/Tubes: PIV  Endocrine: Continue with home synthroid dose  Disposition: SICU    --------------------------------------------------------------------------------------    Critical Care Diagnoses:  Airway compromise  Sepsis SICU Consultation Note  =====================================================  HPI: 89y women with PMH CKD, dementia (non verbal and bed bound), and recurrent UTIs brought in by her daughter (with whom she lives) for concern regarding facial swelling and noisy breathing which started this morning. The patient has been residing with her daughter and is cared for via the [facility] program. She was diagnosed with a UTI the day prior to admission and started on Levaquin. The night prior to the day of admission, the patient was noted to have a Tmax 101. The morning of admission the patients daughters noted erythema and swelling on the left side of the patient's face which progressively increased throughout the day. The swelling increased and the patient's breathing became increasingly stridorous. She underwent CT scan which did not show any abscesses but demonstrated diffuse inflammation of the parotid, salivary glands, and supraglottic edema. In the ED, she was evaluated by MICU who declined to take the patient. She was then evaluated by ENT who found diffuse supraglottic edema and difficulty visualizing the vocal cords.  SICU was consulted by ENT for concern for airway compromise.          PAST MEDICAL & SURGICAL HISTORY:  UTI (urinary tract infection) due to Enterococcus  CKD (chronic kidney disease)  Breast cancer  Hypothyroid  Dementia  No significant past surgical history    Home Meds:    citalopram 40 mg oral tablet: 1 tab(s) orally once a day (14 Sep 2015 15:10)  Colace sodium 100 mg oral capsule: 2 cap(s) orally 2 times a day (14 Sep 2015 15:10)  folic acid 1 mg oral tablet: 1 tab(s) orally once a day (18 Sep 2015 17:27)  lactulose 10 g oral powder for reconstitution: 1 dose(s) orally once a day (18 Sep 2015 18:03)  levalbuterol 1.25 mg/3 mL inhalation solution: 3 milliliter(s) inhaled 2 times a day, As Needed (19 Sep 2015 08:59)  LORazepam 0.5 mg oral tablet: 1 tab(s) orally 3 times a day, As Needed (14 Sep 2015 15:10)  Namenda 10 mg oral tablet: 1 tab(s) orally 2 times a day (14 Sep 2015 15:10)  polyethylene glycol 3350 oral powder for reconstitution: 17 gram(s) orally 2 times a day (14 Sep 2015 15:10)  senna oral tablet: 2 tab(s) orally once a day (at bedtime) (14 Sep 2015 15:10)  Synthroid 75 mcg (0.075 mg) oral tablet: 1 tab(s) orally once a day (14 Sep 2015 15:10)  Tylenol 500 mg oral tablet: 2 tab(s) orally every 6 hours, As Needed (14 Sep 2015 15:10)      Allergies: NKDA  Advanced Directives: After extensive discussion, the patient's daughters (HCP) want everything done for their mother including intubation, cardiovascular resuscitation, ventilatory support and all measures that would prevent her from dying    ROS:    Patient is nonverbal    CURRENT MEDICATIONS:   --------------------------------------------------------------------------------------  Neurologic Medications    Respiratory Medications    Cardiovascular Medications    Gastrointestinal Medications  lactated ringers. 1000 milliLiter(s) IV Continuous <Continuous>    Genitourinary Medications    Hematologic/Oncologic Medications  heparin  Injectable 5000 Unit(s) SubCutaneous every 12 hours    Antimicrobial/Immunologic Medications    Endocrine/Metabolic Medications  levothyroxine Injectable 37 MICROGram(s) IV Push daily    Topical/Other Medications    --------------------------------------------------------------------------------------    VITAL SIGNS, INS/OUTS (last 24 hours):  --------------------------------------------------------------------------------------  T(C): 37.8 (11-28-17 @ 21:45), Max: 38.1 (11-28-17 @ 16:17)  HR: 96 (11-29-17 @ 00:23) (82 - 96)  BP: 132/49 (11-29-17 @ 00:23) (110/47 - 132/49)  BP(mean): 60 (11-28-17 @ 21:45) (60 - 60)  ABP: --  ABP(mean): --  RR: 24 (11-29-17 @ 00:23) (15 - 24)  SpO2: 100% (11-29-17 @ 00:23) (99% - 100%)  Wt(kg): --  CVP(mm Hg): --  CI: --  CAPILLARY BLOOD GLUCOSE      --------------------------------------------------------------------------------------    EXAM:  General/Neuro  Exam: Non-responsive to verbal stimuli; withdraws to tactile stimuli. Severe b/l UE/LE contractures.  Stridorous respirations    Respiratory  Exam: Stridor on exam, no retractions but increased work of breathing noted.  NC in place.    Cardiovascular  Exam: S1, S2.  Regular rate and rhythm.   Cardiac Rhythm: Normal Sinus Rhythm      GI  Exam: Abdomen soft, Non-tender, Non-distended.   Current Diet:  NPO      Tubes/Lines/Drains    [x] Peripheral IV  [] Central Venous Line     	[] R	[] L	[] IJ	[] Fem	[] SC        Type:	    Date Placed:   [] Arterial Line		[] R	[] L	[] Fem	[] Rad	[] Ax	Date Placed:   [] PICC:         	[] Midline		[] Mediport           [] Urinary Catheter		Date Placed:     Extremities  Exam: Extremities contracted, warm, pink, well-perfused.        Derm:  Exam: Good skin turgor, no skin breakdown.      :   Exam: Incontinent of urine    LABS                                            9.7                   Neurophils% (auto):   x      (11-29 @ 12:40):    24.56)-----------(148          Lymphocytes% (auto):  x                                             28.0                   Eosinphils% (auto):   x        Manual%: Neutrophils x    ; Lymphocytes x    ; Eosinophils x    ; Bands%: x    ; Blasts x          11-29    144  |  112<H>  |  137<H>  ----------------------------<  x   4.7   |  x   |  2.59<H>    Ca    8.9      28 Nov 2017 16:06    TPro  7.7  /  Alb  3.7  /  TBili  0.3  /  DBili  x   /  AST  18  /  ALT  20  /  AlkPhos  98  11-28    ( 11-28 @ 16:06 )   PT: 12.6 SEC;   INR: 1.12   aPTT: 28.6 SEC    ABG - ( 29 Nov 2017 01:00 )  pH: 7.24  /  pCO2: 29    /  pO2: 94    / HCO3: 14    / Base Excess: -14.0 /  SaO2: 94.6  / Lactate: 1.0      VBG - ( 28 Nov 2017 16:06 )  pH: 7.26  /  pCO2: 31    /  pO2: 53    / HCO3: 15    / Base Excess: -12.3 /  SvO2: 80.5  / Lactate: 1.8      RECENT CULTURES:  11-28 @ 17:11 URINE CATHETER             --------------------------------------------------------------------------------------  CBC (11-28 @ 16:06)                              10.1<L>                         26.27<H>  )----------------(  172        91.0<H>% Neutrophils, 1.5<L>% Lymphocytes, ANC: --                                  29.9<L>                BMP (11-28 @ 16:06)             141     |  104     |  149<H>		Ca++ --      Ca 8.9                ---------------------------------( 338<H>		Mg --                 4.2     |  12<L>   |  3.07<H>			Ph --        LFTs (11-28 @ 16:06)      TPro 7.7 / Alb 3.7 / TBili 0.3 / DBili -- / AST 18 / ALT 20 / AlkPhos 98    Coags (11-28 @ 16:06)  aPTT 28.6 / INR 1.12 / PT 12.6    ABG (11-29 @ 01:00)     7.24<L> / 29<L> / 94 / 14<L> / -14.0 / 94.6<L>%     Lactate: 1.0    ABG (11-28 @ 16:06)      /  /  /  /  / %     Lactate:   1.8    VBG (11-28 @ 16:06)     7.26<L> / 31<L> / 53<H> / 15<L> / -12.3 / 80.5%      --------------------------------------------------------------------------------------    OTHER LABS    IMAGING RESULTS      ASSESSMENT:  89y Female w/ upper airway swelling concerning for potential airway compromise, likely 2/2 parotitis, also with UTI    PLAN:   Neurologic: Continue with home namenda and citalopram.  Avoid analgesics with the potential to cause respiratory depression  Respiratory: Continue to monitor on 2L nasal cannula. Low threshold for intubation if airway status declines.   Cardiovascular: No active issues, will continue to monitor  Gastrointestinal/Nutrition: NPO for now in setting of possible need for intubation.   Renal/Genitourinary: Continue with Levaquin to treat UTI  Hematologic: SQH for DVT ppx  Infectious Disease: Leukocytosis likely 2/2 UTI and parotitis. Continue with levaquin.    Lines/Tubes: PIV  Endocrine: Continue with home synthroid dose  Disposition: SICU    --------------------------------------------------------------------------------------    Critical Care Diagnoses:  Airway compromise  Sepsis

## 2017-11-29 NOTE — CONSULT NOTE ADULT - SUBJECTIVE AND OBJECTIVE BOX
89yo female with renal disease and dementia present to ED from home with left facial swelling x 1-2 days.  Per daughter she is non verbal at baseline, lives at home, and is full code.  Daughters reports she has been in her usual state aside from being slightly more sedate/more quiet the past 2 days.  They report she has had frequent UTIs.  Daughters report her left face was swollen this morning and tender to palpation.      PMH: above  PSH: none reported  allergies: NKDA    100.1, tachypneic  non responsive  does not open eyes  tachypneic with inspiratory soft stridor  left facial edema and erythema overlying the parotid, left face, and neck  NC pink moist mucosa  Poor compliance with oral exam,  Left oropharynx edema, unable to assess floor of mouth  neck with left SMG tenderness and swelling with overlying skin edema    FOE: nasal cavity normal  nasopharynx normal  mild edema of left oropharynx  base of tongue normal  There is diffuse edema of all supraglottis structures including the epiglottis, AE folds, and arytenoids.  the cords are difficult to see.    CTneck: limited by lack of contrast: l sided facial and salivary gland edema no collection      A/P 89F with left sides parotitis/sialadenitis with diffuse surrounding inflammatory reaction including supraglottitis with tenuous airway status.  - NPO  - Decadron  - Airway watch  - ICU  - consider intubation if status worsens  - broad abx  - left sided parotid and submandibular gland massage every 1-2 hours  - aggressive hydration  - warm compresses  - sialogogues  - examined with attending

## 2017-11-30 LAB
ANISOCYTOSIS BLD QL: SLIGHT — SIGNIFICANT CHANGE UP
ANISOCYTOSIS BLD QL: SLIGHT — SIGNIFICANT CHANGE UP
APTT BLD: 32.2 SEC — SIGNIFICANT CHANGE UP (ref 27.5–37.4)
BACTERIA UR CULT: SIGNIFICANT CHANGE UP
BACTERIA UR CULT: SIGNIFICANT CHANGE UP
BASOPHILS # BLD AUTO: 0.04 K/UL — SIGNIFICANT CHANGE UP (ref 0–0.2)
BASOPHILS NFR BLD AUTO: 0.2 % — SIGNIFICANT CHANGE UP (ref 0–2)
BASOPHILS NFR SPEC: 0 % — SIGNIFICANT CHANGE UP (ref 0–2)
BASOPHILS NFR SPEC: 0 % — SIGNIFICANT CHANGE UP (ref 0–2)
BUN SERPL-MCNC: 133 MG/DL — HIGH (ref 7–23)
BURR CELLS BLD QL SMEAR: PRESENT — SIGNIFICANT CHANGE UP
BURR CELLS BLD QL SMEAR: PRESENT — SIGNIFICANT CHANGE UP
CA-I BLD-SCNC: 1.35 MMOL/L — HIGH (ref 1.03–1.23)
CALCIUM SERPL-MCNC: 10.1 MG/DL — SIGNIFICANT CHANGE UP (ref 8.4–10.5)
CHLORIDE SERPL-SCNC: 118 MMOL/L — HIGH (ref 98–107)
CO2 SERPL-SCNC: 11 MMOL/L — LOW (ref 22–31)
CREAT SERPL-MCNC: 2.58 MG/DL — HIGH (ref 0.5–1.3)
EOSINOPHIL # BLD AUTO: 0 K/UL — SIGNIFICANT CHANGE UP (ref 0–0.5)
EOSINOPHIL NFR BLD AUTO: 0 % — SIGNIFICANT CHANGE UP (ref 0–6)
EOSINOPHIL NFR FLD: 0 % — SIGNIFICANT CHANGE UP (ref 0–6)
EOSINOPHIL NFR FLD: 0 % — SIGNIFICANT CHANGE UP (ref 0–6)
GLUCOSE SERPL-MCNC: 121 MG/DL — HIGH (ref 70–99)
HBA1C BLD-MCNC: 4.8 % — SIGNIFICANT CHANGE UP (ref 4–5.6)
HCT VFR BLD CALC: 27.9 % — LOW (ref 34.5–45)
HCT VFR BLD CALC: 27.9 % — LOW (ref 34.5–45)
HGB BLD-MCNC: 9.4 G/DL — LOW (ref 11.5–15.5)
HGB BLD-MCNC: 9.4 G/DL — LOW (ref 11.5–15.5)
IMM GRANULOCYTES # BLD AUTO: 1.66 # — SIGNIFICANT CHANGE UP
IMM GRANULOCYTES NFR BLD AUTO: 6.3 % — HIGH (ref 0–1.5)
INR BLD: 1.33 — HIGH (ref 0.88–1.17)
LYMPHOCYTES # BLD AUTO: 0.65 K/UL — LOW (ref 1–3.3)
LYMPHOCYTES # BLD AUTO: 2.5 % — LOW (ref 13–44)
LYMPHOCYTES NFR SPEC AUTO: 4 % — LOW (ref 13–44)
LYMPHOCYTES NFR SPEC AUTO: 4 % — LOW (ref 13–44)
MACROCYTES BLD QL: SLIGHT — SIGNIFICANT CHANGE UP
MACROCYTES BLD QL: SLIGHT — SIGNIFICANT CHANGE UP
MAGNESIUM SERPL-MCNC: 2.8 MG/DL — HIGH (ref 1.6–2.6)
MANUAL SMEAR VERIFICATION: SIGNIFICANT CHANGE UP
MANUAL SMEAR VERIFICATION: SIGNIFICANT CHANGE UP
MCHC RBC-ENTMCNC: 32.5 PG — SIGNIFICANT CHANGE UP (ref 27–34)
MCHC RBC-ENTMCNC: 32.5 PG — SIGNIFICANT CHANGE UP (ref 27–34)
MCHC RBC-ENTMCNC: 33.7 % — SIGNIFICANT CHANGE UP (ref 32–36)
MCHC RBC-ENTMCNC: 33.7 % — SIGNIFICANT CHANGE UP (ref 32–36)
MCV RBC AUTO: 96.5 FL — SIGNIFICANT CHANGE UP (ref 80–100)
MCV RBC AUTO: 96.5 FL — SIGNIFICANT CHANGE UP (ref 80–100)
MONOCYTES # BLD AUTO: 0.79 K/UL — SIGNIFICANT CHANGE UP (ref 0–0.9)
MONOCYTES NFR BLD AUTO: 3 % — SIGNIFICANT CHANGE UP (ref 2–14)
MONOCYTES NFR BLD: 3 % — SIGNIFICANT CHANGE UP (ref 2–9)
MONOCYTES NFR BLD: 3 % — SIGNIFICANT CHANGE UP (ref 2–9)
NEUTROPHIL AB SER-ACNC: 85 % — HIGH (ref 43–77)
NEUTROPHIL AB SER-ACNC: 85 % — HIGH (ref 43–77)
NEUTROPHILS # BLD AUTO: 23.18 K/UL — HIGH (ref 1.8–7.4)
NEUTROPHILS NFR BLD AUTO: 88 % — HIGH (ref 43–77)
NEUTS BAND # BLD: 8 % — HIGH (ref 0–6)
NEUTS BAND # BLD: 8 % — HIGH (ref 0–6)
NRBC # FLD: 0 — SIGNIFICANT CHANGE UP
NRBC # FLD: 0 — SIGNIFICANT CHANGE UP
PHOSPHATE SERPL-MCNC: 8.4 MG/DL — HIGH (ref 2.5–4.5)
PLATELET # BLD AUTO: 162 K/UL — SIGNIFICANT CHANGE UP (ref 150–400)
PLATELET # BLD AUTO: 162 K/UL — SIGNIFICANT CHANGE UP (ref 150–400)
PLATELET COUNT - ESTIMATE: NORMAL — SIGNIFICANT CHANGE UP
PLATELET COUNT - ESTIMATE: NORMAL — SIGNIFICANT CHANGE UP
PMV BLD: 12.7 FL — SIGNIFICANT CHANGE UP (ref 7–13)
PMV BLD: 12.7 FL — SIGNIFICANT CHANGE UP (ref 7–13)
POTASSIUM SERPL-MCNC: 4.2 MMOL/L — SIGNIFICANT CHANGE UP (ref 3.5–5.3)
POTASSIUM SERPL-SCNC: 4.2 MMOL/L — SIGNIFICANT CHANGE UP (ref 3.5–5.3)
PROTHROM AB SERPL-ACNC: 15 SEC — HIGH (ref 9.8–13.1)
RBC # BLD: 2.89 M/UL — LOW (ref 3.8–5.2)
RBC # BLD: 2.89 M/UL — LOW (ref 3.8–5.2)
RBC # FLD: 13.9 % — SIGNIFICANT CHANGE UP (ref 10.3–14.5)
RBC # FLD: 13.9 % — SIGNIFICANT CHANGE UP (ref 10.3–14.5)
SCHISTOCYTES BLD QL AUTO: SLIGHT — SIGNIFICANT CHANGE UP
SCHISTOCYTES BLD QL AUTO: SLIGHT — SIGNIFICANT CHANGE UP
SODIUM SERPL-SCNC: 151 MMOL/L — HIGH (ref 135–145)
SPECIMEN SOURCE: SIGNIFICANT CHANGE UP
WBC # BLD: 25.25 K/UL — HIGH (ref 3.8–10.5)
WBC # BLD: 25.25 K/UL — HIGH (ref 3.8–10.5)
WBC # FLD AUTO: 25.25 K/UL — HIGH (ref 3.8–10.5)
WBC # FLD AUTO: 25.25 K/UL — HIGH (ref 3.8–10.5)

## 2017-11-30 PROCEDURE — 71010: CPT | Mod: 26

## 2017-11-30 PROCEDURE — 99233 SBSQ HOSP IP/OBS HIGH 50: CPT

## 2017-11-30 RX ORDER — AMPICILLIN SODIUM AND SULBACTAM SODIUM 250; 125 MG/ML; MG/ML
1.5 INJECTION, POWDER, FOR SUSPENSION INTRAMUSCULAR; INTRAVENOUS EVERY 6 HOURS
Qty: 0 | Refills: 0 | Status: DISCONTINUED | OUTPATIENT
Start: 2017-11-30 | End: 2017-11-30

## 2017-11-30 RX ORDER — HEPARIN SODIUM 5000 [USP'U]/ML
5000 INJECTION INTRAVENOUS; SUBCUTANEOUS EVERY 8 HOURS
Qty: 0 | Refills: 0 | Status: DISCONTINUED | OUTPATIENT
Start: 2017-11-30 | End: 2017-12-09

## 2017-11-30 RX ORDER — SODIUM CHLORIDE 9 MG/ML
1000 INJECTION, SOLUTION INTRAVENOUS
Qty: 0 | Refills: 0 | Status: DISCONTINUED | OUTPATIENT
Start: 2017-11-30 | End: 2017-12-03

## 2017-11-30 RX ORDER — AMPICILLIN SODIUM AND SULBACTAM SODIUM 250; 125 MG/ML; MG/ML
3 INJECTION, POWDER, FOR SUSPENSION INTRAMUSCULAR; INTRAVENOUS EVERY 24 HOURS
Qty: 0 | Refills: 0 | Status: DISCONTINUED | OUTPATIENT
Start: 2017-12-01 | End: 2017-12-01

## 2017-11-30 RX ADMIN — Medication 5 MILLIGRAM(S): at 06:08

## 2017-11-30 RX ADMIN — HEPARIN SODIUM 5000 UNIT(S): 5000 INJECTION INTRAVENOUS; SUBCUTANEOUS at 22:05

## 2017-11-30 RX ADMIN — HEPARIN SODIUM 5000 UNIT(S): 5000 INJECTION INTRAVENOUS; SUBCUTANEOUS at 15:39

## 2017-11-30 RX ADMIN — Medication 5 MILLIGRAM(S): at 18:35

## 2017-11-30 RX ADMIN — SODIUM CHLORIDE 100 MILLILITER(S): 9 INJECTION, SOLUTION INTRAVENOUS at 09:26

## 2017-11-30 RX ADMIN — AMPICILLIN SODIUM AND SULBACTAM SODIUM 100 GRAM(S): 250; 125 INJECTION, POWDER, FOR SUSPENSION INTRAMUSCULAR; INTRAVENOUS at 09:26

## 2017-11-30 RX ADMIN — Medication 37 MICROGRAM(S): at 06:09

## 2017-11-30 RX ADMIN — Medication: at 18:36

## 2017-11-30 RX ADMIN — HEPARIN SODIUM 5000 UNIT(S): 5000 INJECTION INTRAVENOUS; SUBCUTANEOUS at 06:08

## 2017-11-30 RX ADMIN — SODIUM CHLORIDE 100 MILLILITER(S): 9 INJECTION, SOLUTION INTRAVENOUS at 15:39

## 2017-11-30 RX ADMIN — Medication 5 MILLIGRAM(S): at 11:31

## 2017-11-30 NOTE — PROGRESS NOTE ADULT - SUBJECTIVE AND OBJECTIVE BOX
Patient seen and examined on AM rounds. breathing comfortably. No acute events overnight    Phys Exam  O2 100% on NC  NAD, non responsive  breathing comfortably, no retractions, no stridor, no stertor  left facial edema and erythema overlying the parotid, left face, and neck  NC nasal mucosa appears dry with crusting, small area of friable mucosa on left nasal septum  Poor compliance with oral exam,  Left oropharynx edema, unable to assess floor of mouth  neck with left SMG tenderness and swelling with overlying skin edema    A/P 89F with left sides parotitis/sialadenitis with diffuse surrounding inflammatory reaction including supraglottitis  - would limit nasal cannula, as nasal mucosa and upper aerodigestive tract appears very dessicated - recommend humidified face tent  - NPO  - Decadron  - Airway watch  - ICU  - consider intubation if status worsens  - broad abx  - warm compresses and left sided parotid and submandibular gland massage every 2-3hrs  - aggressive hydration  - sialogogues  - will d/w attending

## 2017-11-30 NOTE — PROGRESS NOTE ADULT - ASSESSMENT
89F with left parotitis, submandibular adenitis with dependent laryngeal edema, now resolved  -ok to transfer to floor  -continue antibiotics  -continue parotid and submandibular massage  -warm compresses to left face  -wean steroids  -d/w attending, Dr. Bingham

## 2017-11-30 NOTE — PROGRESS NOTE ADULT - SUBJECTIVE AND OBJECTIVE BOX
SICU AM progress  Note  =====================================================    Overnight / Interval Events: Pt is sating well on trach collar. tylenol given for pain control     HPI: 89y women with PMH CKD, dementia (non verbal and bed bound), and recurrent UTIs brought in by her daughter (with whom she lives) for concern regarding facial swelling and noisy breathing which started this morning. The patient has been residing with her daughter and is cared for via the [facility] program. She was diagnosed with a UTI the day prior to admission and started on Levaquin. The night prior to the day of admission, the patient was noted to have a Tmax 101. The morning of admission the patients daughters noted erythema and swelling on the left side of the patient's face which progressively increased throughout the day. The swelling increased and the patient's breathing became increasingly stridorous. She underwent CT scan which did not show any abscesses but demonstrated diffuse inflammation of the parotid, salivary glands, and supraglottic edema. In the ED, she was evaluated by MICU who declined to take the patient. She was then evaluated by ENT who found diffuse supraglottic edema and difficulty visualizing the vocal cords.  SICU was consulted by ENT for concern for airway compromise.          PAST MEDICAL & SURGICAL HISTORY:  UTI (urinary tract infection) due to Enterococcus  CKD (chronic kidney disease)  Breast cancer  Hypothyroid  Dementia  No significant past surgical history    Home Meds:    citalopram 40 mg oral tablet: 1 tab(s) orally once a day (14 Sep 2015 15:10)  Colace sodium 100 mg oral capsule: 2 cap(s) orally 2 times a day (14 Sep 2015 15:10)  folic acid 1 mg oral tablet: 1 tab(s) orally once a day (18 Sep 2015 17:27)  lactulose 10 g oral powder for reconstitution: 1 dose(s) orally once a day (18 Sep 2015 18:03)  levalbuterol 1.25 mg/3 mL inhalation solution: 3 milliliter(s) inhaled 2 times a day, As Needed (19 Sep 2015 08:59)  LORazepam 0.5 mg oral tablet: 1 tab(s) orally 3 times a day, As Needed (14 Sep 2015 15:10)  Namenda 10 mg oral tablet: 1 tab(s) orally 2 times a day (14 Sep 2015 15:10)  polyethylene glycol 3350 oral powder for reconstitution: 17 gram(s) orally 2 times a day (14 Sep 2015 15:10)  senna oral tablet: 2 tab(s) orally once a day (at bedtime) (14 Sep 2015 15:10)  Synthroid 75 mcg (0.075 mg) oral tablet: 1 tab(s) orally once a day (14 Sep 2015 15:10)  Tylenol 500 mg oral tablet: 2 tab(s) orally every 6 hours, As Needed (14 Sep 2015 15:10)      Allergies: NKDA  Advanced Directives: After extensive discussion, the patient's daughters (HCP) want everything done for their mother including intubation, cardiovascular resuscitation, ventilatory support and all measures that would prevent her from dying    ROS:    Patient is nonverbal    CURRENT MEDICATIONS:   --------------------------------------------------------------------------------------  Neurologic Medications    Respiratory Medications    Cardiovascular Medications    Gastrointestinal Medications  lactated ringers. 1000 milliLiter(s) IV Continuous <Continuous>    Genitourinary Medications    Hematologic/Oncologic Medications  heparin  Injectable 5000 Unit(s) SubCutaneous every 12 hours    Antimicrobial/Immunologic Medications    Endocrine/Metabolic Medications  levothyroxine Injectable 37 MICROGram(s) IV Push daily    Topical/Other Medications    --------------------------------------------------------------------------------------    VITAL SIGNS, INS/OUTS (last 24 hours):  --------------------------------------------------------------------------------------  T(C): 37.1 (17 @ 04:00), Max: 37.8 (17 @ 08:00)  HR: 78 (17 05:00) (69 - 95)  BP: 150/69 (17 04:00) (123/60 - 156/73)  BP(mean): 84 (17 @ 04:00) (74 - 94)  ABP: --  ABP(mean): --  RR: 18 (17 05:00) (12 - 20)  SpO2: 100% (17 05:00) (98% - 100%)  Wt(kg): --  CVP(mm Hg): --  CI: --  CAPILLARY BLOOD GLUCOSE      POCT Blood Glucose.: 151 mg/dL (2017 23:52)  POCT Blood Glucose.: 167 mg/dL (2017 21:59)  POCT Blood Glucose.: 157 mg/dL (2017 18:25)  POCT Blood Glucose.: 162 mg/dL (2017 14:14)   N/A       @ 07:  -   @ 07:00  --------------------------------------------------------  IN:    IV PiggyBack: 150 mL    lactated ringers.: 150 mL  Total IN: 300 mL    OUT:  Total OUT: 0 mL    Total NET: 300 mL       @ 07:  -   @ 05:17  --------------------------------------------------------  IN:    IV PiggyBack: 150 mL    lactated ringers.: 125 mL    sodium chloride 0.9%: 1000 mL    sodium chloride 0.9%: 1000 mL  Total IN: 2275 mL    OUT:    Indwelling Catheter - Urethral: 2220 mL  Total OUT: 2220 mL    Total NET: 55 mL          --------------------------------------------------------------------------------------    EXAM:  General/Neuro  Exam: Non-responsive to verbal stimuli; withdraws to tactile stimuli. Severe b/l UE/LE contractures.     Respiratory  Exam: Stridor on exam, no retractions but increased work of breathing noted.  On trach collar     Cardiovascular  Exam: S1, S2.  Regular rate and rhythm.   Cardiac Rhythm: Normal Sinus Rhythm      GI  Exam: Abdomen soft, Non-tender, Non-distended.   Current Diet:  NPO      Tubes/Lines/Drains    [x] Peripheral IV  [] Central Venous Line     	[] R	[] L	[] IJ	[] Fem	[] SC        Type:	    Date Placed:   [] Arterial Line		[] R	[] L	[] Fem	[] Rad	[] Ax	Date Placed:   [] PICC:         	[] Midline		[] Mediport           [] Urinary Catheter		Date Placed:     Extremities  Exam: Extremities contracted, warm, pink, well-perfused.        Derm:  Exam: Good skin turgor, no skin breakdown.      :   Exam: Incontinent of urine    LABS             CBC ( @ 03:40)                              9.4<L>                       25.25<H>  )--------------(  162        --    % Neuts, --    % Lymphs, ANC: --                                  27.9<L>  CBC ( @ 12:40)                              9.7<L>                       24.56<H>  )--------------(  148<L>     --    % Neuts, --    % Lymphs, ANC: --                                  28.0<L>    BMP ( @ 03:40)             151<H>  |  118<H>  |  133<H>		Ca++ 1.35<H>  Ca 10.1               ---------------------------------( 121<H>		Mg 2.8<H>             4.2     |  11<L>   |  2.58<H>			Ph 8.4<H>  BMP ( @ 17:11)             145     |  112<H>  |  132<H>		Ca++ --      Ca 9.8                ---------------------------------( 152<H>		Mg 2.7<H>             4.0     |  11<L>   |  2.56<H>			Ph 8.3<H>      Coags ( @ 03:40)  aPTT 32.2 / INR 1.33<H> / PT 15.0<H>      ABG ( @ 01:00)     7.24<L> / 29<L> / 94 / 14<L> / -14.0 / 94.6<L>%     Lactate: 1.0        Urinalysis ( @ 16:53):     Color: PLYEL / Appearance: HAZY / S.013 / pH: 5.5 / Gluc: 100 / Ketones: NEGATIVE / Bili: NEGATIVE / Urobili: NORMAL / Protein :100<H> / Nitrites: NEGATIVE / Leuk.Est: LARGE<H> / RBC:  / WBC: >50<H> / Sq Epi:  / Non Sq Epi:  / Bacteria MANY<H>       -> URINE CATHETER Culture ( @ 17:11)     NG    NG  NG    -> BLOOD PERIPHERAL Culture ( @ 16:37)       ***** CRITICAL RESULT *****  PERSON CALLED / READ-BACK: DR MACARIO RICHARD / Y  DATE / TIME CALLED: [id number]  CALLED BY: KALIN SCHMID  GPCCL^Gram Pos Cocci In Clusters  AFTER: 21 HOURS INCUBATION  BOTTLE: ANAEROBIC BOTTLE    BLOOD CULTURE PCR  NG      OTHER LABS    IMAGING RESULTS SICU AM progress  Note  =====================================================    Overnight / Interval Events: Pt is sating well on trach collar. tylenol given for pain control     HPI: 89y women with PMH CKD, dementia (non verbal and bed bound), and recurrent UTIs brought in by her daughter (with whom she lives) for concern regarding facial swelling and noisy breathing which started this morning. The patient has been residing with her daughter and is cared for via the [facility] program. She was diagnosed with a UTI the day prior to admission and started on Levaquin. The night prior to the day of admission, the patient was noted to have a Tmax 101. The morning of admission the patients daughters noted erythema and swelling on the left side of the patient's face which progressively increased throughout the day. The swelling increased and the patient's breathing became increasingly stridorous. She underwent CT scan which did not show any abscesses but demonstrated diffuse inflammation of the parotid, salivary glands, and supraglottic edema. In the ED, she was evaluated by MICU who declined to take the patient. She was then evaluated by ENT who found diffuse supraglottic edema and difficulty visualizing the vocal cords.  SICU was consulted by ENT for concern for airway compromise.      PAST MEDICAL & SURGICAL HISTORY:  UTI (urinary tract infection) due to Enterococcus  CKD (chronic kidney disease)  Breast cancer  Hypothyroid  Dementia  No significant past surgical history    Home Meds:    citalopram 40 mg oral tablet: 1 tab(s) orally once a day (14 Sep 2015 15:10)  Colace sodium 100 mg oral capsule: 2 cap(s) orally 2 times a day (14 Sep 2015 15:10)  folic acid 1 mg oral tablet: 1 tab(s) orally once a day (18 Sep 2015 17:27)  lactulose 10 g oral powder for reconstitution: 1 dose(s) orally once a day (18 Sep 2015 18:03)  levalbuterol 1.25 mg/3 mL inhalation solution: 3 milliliter(s) inhaled 2 times a day, As Needed (19 Sep 2015 08:59)  LORazepam 0.5 mg oral tablet: 1 tab(s) orally 3 times a day, As Needed (14 Sep 2015 15:10)  Namenda 10 mg oral tablet: 1 tab(s) orally 2 times a day (14 Sep 2015 15:10)  polyethylene glycol 3350 oral powder for reconstitution: 17 gram(s) orally 2 times a day (14 Sep 2015 15:10)  senna oral tablet: 2 tab(s) orally once a day (at bedtime) (14 Sep 2015 15:10)  Synthroid 75 mcg (0.075 mg) oral tablet: 1 tab(s) orally once a day (14 Sep 2015 15:10)  Tylenol 500 mg oral tablet: 2 tab(s) orally every 6 hours, As Needed (14 Sep 2015 15:10)      Allergies: NKDA  Advanced Directives: After extensive discussion, the patient's daughters (HCP) want everything done for their mother including intubation, cardiovascular resuscitation, ventilatory support and all measures that would prevent her from dying    ROS:    Patient is nonverbal    CURRENT MEDICATIONS:   --------------------------------------------------------------------------------------  Neurologic Medications    Respiratory Medications    Cardiovascular Medications    Gastrointestinal Medications  lactated ringers. 1000 milliLiter(s) IV Continuous <Continuous>    Genitourinary Medications    Hematologic/Oncologic Medications  heparin  Injectable 5000 Unit(s) SubCutaneous every 12 hours    Antimicrobial/Immunologic Medications    Endocrine/Metabolic Medications  levothyroxine Injectable 37 MICROGram(s) IV Push daily    Topical/Other Medications    --------------------------------------------------------------------------------------    VITAL SIGNS, INS/OUTS (last 24 hours):  --------------------------------------------------------------------------------------  T(C): 37.1 (17 @ 04:00), Max: 37.8 (17 @ 08:00)  HR: 78 (17 05:00) (69 - 95)  BP: 150/69 (17 04:00) (123/60 - 156/73)  BP(mean): 84 (17 04:00) (74 - 94)  RR: 18 (17 05:00) (12 - 20)  SpO2: 100% (17 05:00) (98% - 100%)      POCT Blood Glucose.: 151 mg/dL (2017 23:52)  POCT Blood Glucose.: 167 mg/dL (2017 21:59)  POCT Blood Glucose.: 157 mg/dL (2017 18:25)  POCT Blood Glucose.: 162 mg/dL (2017 14:14)   N/A       @ 07:  -   @ 07:00  --------------------------------------------------------  IN:    IV PiggyBack: 150 mL    lactated ringers.: 150 mL  Total IN: 300 mL    OUT:  Total OUT: 0 mL    Total NET: 300 mL       @ 07: @ 05:17  --------------------------------------------------------  IN:    IV PiggyBack: 150 mL    lactated ringers.: 125 mL    sodium chloride 0.9%: 1000 mL    sodium chloride 0.9%: 1000 mL  Total IN: 2275 mL    OUT:    Indwelling Catheter - Urethral: 2220 mL  Total OUT: 2220 mL    Total NET: 55 mL  --------------------------------------------------------------------------------------    EXAM:  General/Neuro  Exam: Non-responsive to verbal stimuli; withdraws to tactile stimuli. Severe b/l UE/LE contractures.     Respiratory  Exam: Stridor on exam, no retractions but increased work of breathing noted.  On trach collar     Cardiovascular  Exam: S1, S2.  Regular rate and rhythm.   Cardiac Rhythm: Normal Sinus Rhythm      GI  Exam: Abdomen soft, Non-tender, Non-distended.   Current Diet:  NPO      Tubes/Lines/Drains    [x] Peripheral IV  [] Central Venous Line     	[] R	[] L	[] IJ	[] Fem	[] SC        Type:	    Date Placed:   [] Arterial Line		[] R	[] L	[] Fem	[] Rad	[] Ax	Date Placed:   [] PICC:         	[] Midline		[] Mediport           [] Urinary Catheter		Date Placed:     Extremities  Exam: Extremities contracted, warm, pink, well-perfused.        Derm:  Exam: Good skin turgor, no skin breakdown.      :   Exam: Incontinent of urine    LABS             CBC ( @ 03:40)                              9.4<L>                       25.25<H>  )--------------(  162        --    % Neuts, --    % Lymphs, ANC: --                                  27.9<L>  CBC ( @ 12:40)                              9.7<L>                       24.56<H>  )--------------(  148<L>     --    % Neuts, --    % Lymphs, ANC: --                                  28.0<L>    BMP ( @ 03:40)             151<H>  |  118<H>  |  133<H>		Ca++ 1.35<H>  Ca 10.1               ---------------------------------( 121<H>		Mg 2.8<H>             4.2     |  11<L>   |  2.58<H>			Ph 8.4<H>  BMP ( @ 17:11)             145     |  112<H>  |  132<H>		Ca++ --      Ca 9.8                ---------------------------------( 152<H>		Mg 2.7<H>             4.0     |  11<L>   |  2.56<H>			Ph 8.3<H>      Coags ( @ 03:40)  aPTT 32.2 / INR 1.33<H> / PT 15.0<H>      ABG ( @ 01:00)     7.24<L> / 29<L> / 94 / 14<L> / -14.0 / 94.6<L>%     Lactate: 1.0        Urinalysis ( @ 16:53):     Color: PLYEL / Appearance: HAZY / S.013 / pH: 5.5 / Gluc: 100 / Ketones: NEGATIVE / Bili: NEGATIVE / Urobili: NORMAL / Protein :100<H> / Nitrites: NEGATIVE / Leuk.Est: LARGE<H> / RBC:  / WBC: >50<H> / Sq Epi:  / Non Sq Epi:  / Bacteria MANY<H>       -> URINE CATHETER Culture ( @ 17:11)     NG    NG  NG    -> BLOOD PERIPHERAL Culture ( @ 16:37)       ***** CRITICAL RESULT *****  PERSON CALLED / READ-BACK: DR MACARIO RICHARD / Y  DATE / TIME CALLED: [id number]  CALLED BY: KALIN SCHMID  GPCCL^Gram Pos Cocci In Clusters  AFTER: 21 HOURS INCUBATION  BOTTLE: ANAEROBIC BOTTLE    BLOOD CULTURE PCR  NG

## 2017-11-30 NOTE — PROGRESS NOTE ADULT - ASSESSMENT
ASSESSMENT:  89y Female w/ upper airway swelling concerning for potential airway compromise, likely 2/2 parotitis, also with UTI    PLAN:   Neurologic: Continue with  namenda and citalopram.  Avoid analgesics with the potential to cause respiratory depression  Respiratory: Continue to monitor on 2L nasal cannula. Low threshold for intubation if airway status declines.   Cardiovascular: No active issues, will continue to monitor  Gastrointestinal/Nutrition: NPO for now in setting of possible need for intubation.   Renal/Genitourinary: Continue with Levaquin to treat UTI  Hematologic: SQH for DVT ppx  Infectious Disease: Leukocytosis likely 2/2 UTI and parotitis. Continue with levaquin.    Lines/Tubes: PIV  Endocrine: Continue with home synthroid dose  Disposition: SICU    --------------------------------------------------------------------------------------    Critical Care Diagnoses:  Airway compromise  Sepsis ASSESSMENT:  89y Female w/ upper airway swelling concerning for potential airway compromise, secondary to parotitis and , also with UTI    PLAN:   Neurologic: Continue with namenda and citalopram.  Avoid analgesics with the potential to cause respiratory depression  Respiratory: Continue to monitor on face tent, humidified air. Low threshold for intubation if airway status declines.   Cardiovascular: No active issues, will continue to monitor  Gastrointestinal/Nutrition: NPO for now in setting of possible need for intubation.   Renal/Genitourinary: Continue with Levaquin to treat UTI  Hematologic: SQH for DVT ppx  Infectious Disease: Leukocytosis likely 2/2 UTI and parotitis. Continue with unasyn.    Lines/Tubes: PIV  Endocrine: Continue with home synthroid dose  Disposition: SICU    --------------------------------------------------------------------------------------    Critical Care Diagnoses:  Airway compromise  Sepsis

## 2017-11-30 NOTE — PROGRESS NOTE ADULT - SUBJECTIVE AND OBJECTIVE BOX
Patient seen and examined at bedside this AM    Exam  nad  does not respond appropriately  breathing comfortably on face tent  no stridor  voice is strong    repeat laryngoscopy shows epiglottis is sharp, vocal cords mobile bilaterally with no edema, thick secretions from soft palate to hypopharynx, unable to visualize arytenoids well but AE folds are without edema

## 2017-12-01 ENCOUNTER — TRANSCRIPTION ENCOUNTER (OUTPATIENT)
Age: 82
End: 2017-12-01

## 2017-12-01 LAB
BUN SERPL-MCNC: 131 MG/DL — HIGH (ref 7–23)
CALCIUM SERPL-MCNC: 9.5 MG/DL — SIGNIFICANT CHANGE UP (ref 8.4–10.5)
CHLORIDE SERPL-SCNC: 121 MMOL/L — HIGH (ref 98–107)
CO2 SERPL-SCNC: 11 MMOL/L — LOW (ref 22–31)
CREAT SERPL-MCNC: 2.44 MG/DL — HIGH (ref 0.5–1.3)
GLUCOSE SERPL-MCNC: 133 MG/DL — HIGH (ref 70–99)
HCT VFR BLD CALC: 26.4 % — LOW (ref 34.5–45)
HGB BLD-MCNC: 8.7 G/DL — LOW (ref 11.5–15.5)
MAGNESIUM SERPL-MCNC: 2.7 MG/DL — HIGH (ref 1.6–2.6)
MCHC RBC-ENTMCNC: 31.8 PG — SIGNIFICANT CHANGE UP (ref 27–34)
MCHC RBC-ENTMCNC: 33 % — SIGNIFICANT CHANGE UP (ref 32–36)
MCV RBC AUTO: 96.4 FL — SIGNIFICANT CHANGE UP (ref 80–100)
NRBC # FLD: 0 — SIGNIFICANT CHANGE UP
ORGANISM # SPEC MICROSCOPIC CNT: SIGNIFICANT CHANGE UP
ORGANISM # SPEC MICROSCOPIC CNT: SIGNIFICANT CHANGE UP
PHOSPHATE SERPL-MCNC: 7.2 MG/DL — HIGH (ref 2.5–4.5)
PLATELET # BLD AUTO: 178 K/UL — SIGNIFICANT CHANGE UP (ref 150–400)
PMV BLD: 13.9 FL — HIGH (ref 7–13)
POTASSIUM SERPL-MCNC: 3.8 MMOL/L — SIGNIFICANT CHANGE UP (ref 3.5–5.3)
POTASSIUM SERPL-SCNC: 3.8 MMOL/L — SIGNIFICANT CHANGE UP (ref 3.5–5.3)
RBC # BLD: 2.74 M/UL — LOW (ref 3.8–5.2)
RBC # FLD: 14.3 % — SIGNIFICANT CHANGE UP (ref 10.3–14.5)
SODIUM SERPL-SCNC: 154 MMOL/L — HIGH (ref 135–145)
WBC # BLD: 25.43 K/UL — HIGH (ref 3.8–10.5)
WBC # FLD AUTO: 25.43 K/UL — HIGH (ref 3.8–10.5)

## 2017-12-01 RX ORDER — DEXTROSE 50 % IN WATER 50 %
12.5 SYRINGE (ML) INTRAVENOUS ONCE
Qty: 0 | Refills: 0 | Status: DISCONTINUED | OUTPATIENT
Start: 2017-12-01 | End: 2017-12-09

## 2017-12-01 RX ORDER — ACETAMINOPHEN 500 MG
325 TABLET ORAL EVERY 6 HOURS
Qty: 0 | Refills: 0 | Status: DISCONTINUED | OUTPATIENT
Start: 2017-12-01 | End: 2017-12-09

## 2017-12-01 RX ORDER — GLUCAGON INJECTION, SOLUTION 0.5 MG/.1ML
1 INJECTION, SOLUTION SUBCUTANEOUS ONCE
Qty: 0 | Refills: 0 | Status: DISCONTINUED | OUTPATIENT
Start: 2017-12-01 | End: 2017-12-09

## 2017-12-01 RX ORDER — INSULIN LISPRO 100/ML
VIAL (ML) SUBCUTANEOUS
Qty: 0 | Refills: 0 | Status: DISCONTINUED | OUTPATIENT
Start: 2017-12-01 | End: 2017-12-09

## 2017-12-01 RX ORDER — VANCOMYCIN HCL 1 G
750 VIAL (EA) INTRAVENOUS ONCE
Qty: 0 | Refills: 0 | Status: COMPLETED | OUTPATIENT
Start: 2017-12-01 | End: 2017-12-01

## 2017-12-01 RX ORDER — DEXTROSE 50 % IN WATER 50 %
25 SYRINGE (ML) INTRAVENOUS ONCE
Qty: 0 | Refills: 0 | Status: DISCONTINUED | OUTPATIENT
Start: 2017-12-01 | End: 2017-12-09

## 2017-12-01 RX ORDER — ACETAMINOPHEN 500 MG
1000 TABLET ORAL ONCE
Qty: 0 | Refills: 0 | Status: COMPLETED | OUTPATIENT
Start: 2017-12-01 | End: 2017-12-01

## 2017-12-01 RX ORDER — OXYCODONE AND ACETAMINOPHEN 5; 325 MG/1; MG/1
1 TABLET ORAL EVERY 6 HOURS
Qty: 0 | Refills: 0 | Status: DISCONTINUED | OUTPATIENT
Start: 2017-12-01 | End: 2017-12-05

## 2017-12-01 RX ORDER — SODIUM CHLORIDE 9 MG/ML
1000 INJECTION, SOLUTION INTRAVENOUS
Qty: 0 | Refills: 0 | Status: DISCONTINUED | OUTPATIENT
Start: 2017-12-01 | End: 2017-12-09

## 2017-12-01 RX ORDER — DEXTROSE 50 % IN WATER 50 %
1 SYRINGE (ML) INTRAVENOUS ONCE
Qty: 0 | Refills: 0 | Status: DISCONTINUED | OUTPATIENT
Start: 2017-12-01 | End: 2017-12-09

## 2017-12-01 RX ORDER — DOCUSATE SODIUM 100 MG
100 CAPSULE ORAL THREE TIMES A DAY
Qty: 0 | Refills: 0 | Status: DISCONTINUED | OUTPATIENT
Start: 2017-12-01 | End: 2017-12-09

## 2017-12-01 RX ORDER — DEXAMETHASONE 0.5 MG/5ML
3 ELIXIR ORAL EVERY 8 HOURS
Qty: 0 | Refills: 0 | Status: COMPLETED | OUTPATIENT
Start: 2017-12-01 | End: 2017-12-02

## 2017-12-01 RX ORDER — OXYCODONE HYDROCHLORIDE 5 MG/1
5 TABLET ORAL EVERY 4 HOURS
Qty: 0 | Refills: 0 | Status: DISCONTINUED | OUTPATIENT
Start: 2017-12-01 | End: 2017-12-01

## 2017-12-01 RX ORDER — VANCOMYCIN HCL 1 G
VIAL (EA) INTRAVENOUS
Qty: 0 | Refills: 0 | Status: DISCONTINUED | OUTPATIENT
Start: 2017-12-01 | End: 2017-12-01

## 2017-12-01 RX ORDER — SENNA PLUS 8.6 MG/1
2 TABLET ORAL AT BEDTIME
Qty: 0 | Refills: 0 | Status: DISCONTINUED | OUTPATIENT
Start: 2017-12-01 | End: 2017-12-09

## 2017-12-01 RX ORDER — ACETAMINOPHEN 500 MG
650 TABLET ORAL EVERY 6 HOURS
Qty: 0 | Refills: 0 | Status: DISCONTINUED | OUTPATIENT
Start: 2017-12-01 | End: 2017-12-07

## 2017-12-01 RX ADMIN — Medication 5 MILLIGRAM(S): at 13:10

## 2017-12-01 RX ADMIN — AMPICILLIN SODIUM AND SULBACTAM SODIUM 200 GRAM(S): 250; 125 INJECTION, POWDER, FOR SUSPENSION INTRAMUSCULAR; INTRAVENOUS at 11:17

## 2017-12-01 RX ADMIN — Medication 100 MILLIGRAM(S): at 22:07

## 2017-12-01 RX ADMIN — Medication 2: at 13:10

## 2017-12-01 RX ADMIN — Medication 1: at 00:31

## 2017-12-01 RX ADMIN — HEPARIN SODIUM 5000 UNIT(S): 5000 INJECTION INTRAVENOUS; SUBCUTANEOUS at 13:15

## 2017-12-01 RX ADMIN — Medication 3 MILLIGRAM(S): at 22:07

## 2017-12-01 RX ADMIN — OXYCODONE AND ACETAMINOPHEN 1 TABLET(S): 5; 325 TABLET ORAL at 17:36

## 2017-12-01 RX ADMIN — Medication 5 MILLIGRAM(S): at 17:06

## 2017-12-01 RX ADMIN — Medication 400 MILLIGRAM(S): at 03:29

## 2017-12-01 RX ADMIN — Medication 1000 MILLIGRAM(S): at 03:59

## 2017-12-01 RX ADMIN — HEPARIN SODIUM 5000 UNIT(S): 5000 INJECTION INTRAVENOUS; SUBCUTANEOUS at 06:55

## 2017-12-01 RX ADMIN — OXYCODONE AND ACETAMINOPHEN 1 TABLET(S): 5; 325 TABLET ORAL at 17:06

## 2017-12-01 RX ADMIN — Medication 150 MILLIGRAM(S): at 17:07

## 2017-12-01 RX ADMIN — SODIUM CHLORIDE 100 MILLILITER(S): 9 INJECTION, SOLUTION INTRAVENOUS at 11:17

## 2017-12-01 RX ADMIN — Medication 5 MILLIGRAM(S): at 00:32

## 2017-12-01 RX ADMIN — Medication 5 MILLIGRAM(S): at 06:55

## 2017-12-01 RX ADMIN — Medication 2: at 17:07

## 2017-12-01 RX ADMIN — OXYCODONE HYDROCHLORIDE 5 MILLIGRAM(S): 5 TABLET ORAL at 14:28

## 2017-12-01 RX ADMIN — SENNA PLUS 2 TABLET(S): 8.6 TABLET ORAL at 22:08

## 2017-12-01 RX ADMIN — Medication 37 MICROGRAM(S): at 06:55

## 2017-12-01 RX ADMIN — OXYCODONE HYDROCHLORIDE 5 MILLIGRAM(S): 5 TABLET ORAL at 13:58

## 2017-12-01 RX ADMIN — HEPARIN SODIUM 5000 UNIT(S): 5000 INJECTION INTRAVENOUS; SUBCUTANEOUS at 22:08

## 2017-12-01 NOTE — CONSULT NOTE ADULT - ASSESSMENT
Conclusion:   It appears that the surrogate decision makers are representing the patient’s advanced directives, but the MOLST form, if one is available, would be very helpful in supporting this conclusion.   Provider moral distress must be balanced with patient autonomy.  In general though, providers are not required to perform interventions with they feel are both futile and excessively burdensome on the patient.   This patient would benefit from further goals of care and expectations setting discussions between the surrogates and the primary team.  At present time her daughters have declined DNR status in accordance with their personal beliefs.  The medical ethics service is happy to facilitate such a meeting if needed.  Alternatively, the primary team could conduct this discussion independently, or consult palliative care for assistance.   Thank you for this consult. Medical Ethics will remain available to the team, patient, and family throughout the hospital stay. please contact us for further clarification as needed.       Case discussed with Medical Ethicist Dr Omar Huerta  More than 50% of the time of this consultation was spent in coordination of Care of Patient

## 2017-12-01 NOTE — DISCHARGE NOTE ADULT - HOSPITAL COURSE
89 year old female that presented with dyspnea and found to have supraglottic edema. Treated with Iv steroids and observation and discharged home on... 89 year old female that presented to the hospital with facial swelling and pain. . Treated with Iv antibiotics, steroids and observation and discharged home on...

## 2017-12-01 NOTE — PROGRESS NOTE ADULT - ASSESSMENT
a/p 89F with left parotid and submandibular adenitis  -pain control  -parotid and submandibular massage  -iv antibiotics  -warm compresses  -wean decadron  -tube feeds  -trial of void  -oob  -will d/w attending

## 2017-12-01 NOTE — DISCHARGE NOTE ADULT - PATIENT PORTAL LINK FT
“You can access the [facility] Patient Portal, offered by [facility], by registering with the following website: [email or web address]”

## 2017-12-01 NOTE — PROGRESS NOTE ADULT - SUBJECTIVE AND OBJECTIVE BOX
Patient seen and examined this AM  No acute events overnight    Exam  Nad, awake and alert  Breathing comfortably on face tent  No stridor at rest  Left parotid and submandibular gland with induration, swelling and tenderness to palpation  Stable compared to yesterday  Patient will not cooperate with oral exam

## 2017-12-01 NOTE — DISCHARGE NOTE ADULT - CARE PROVIDER_API CALL
Pedro Bingham), Otolaryngology  [address] [place] [address]  Phone: [phone]  Fax: [phone]

## 2017-12-01 NOTE — DISCHARGE NOTE ADULT - CARE PLAN
Principal Discharge DX:	Supraglottic edema  Goal:	observation and steroids  Instructions for follow-up, activity and diet:	soft to regular as tolerates

## 2017-12-01 NOTE — CONSULT NOTE ADULT - SUBJECTIVE AND OBJECTIVE BOX
Clinical summary: Luanne Leger met with the surgical resident Erin Crowley who gave the patient’s history. She was admitted through the ER on November 28 2017 with fever and acute respiratory distress caused by Left Parotitis/Sialadenitis and supraglottic swelling. On admission the Health Care Proxy, one of her daughters, stated that she wished her mother to have full resuscitation to include intubation and cardiopulmonary resuscitation, and possibly tracheostomy if needed. This was later emphasized as the family wish by both daughters (Caron and Luanne Thomas), who are named as Health Care Proxies. They also said that they have a document at home, possibly a MOLST, which they will bring in and which outlines that all and full resuscitations should be attempted if required. Ms. Esparza is a patient of the [facility] team.  At this time the patient has shown good response to intravenous steroids and antibiotics with clinical resolution of the laryngeal edema as seen on repeat laryngoscopy on November 30th. The health care team feel that because of her underlying co - morbidities CPR and/or mechanical ventilation would pose an excessive burden on the patient, and would likely be futile.     PMH: Dementia with contractures progressively worsening over 20 years. The aetiology is unknown but the patient is completely dependent for all activities of daily living (ADL). She does respond to people she knows and smiles, eats well and enjoys music which helps to calm her. 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 Nipple removal for “breast cancer” but type of cancer  and date of this is unknown. BMI is 20.3  SH. Lives with Caron her elder unmarried daughter. Both daughters are very involved in her care and there is a home health aide named Josue who has been with the family for 9 years and was at her bedside today.  				   Prognosis Estimate (survival in days, wks, mos, yrs):unknown					  Patient Decision-Making Capacity:  Has capacity    	 Lacks capacity   Patient Aware of:  Diagnosis:   Yes    No   Unknown    Prognosis:   Yes    No   Unknown         Name of medical decision-maker should patient lack capacity: Caron     Relationship: daughter 		     Role:   Health Care Agent      Legal Surrogate   Contact #(s): [phone]			    Other Decision-Maker (i.e., HCA or Surrogate) [place] . daughter  Aware of:  Diagnosis: Yes   No      Prognosis:   Yes     No   Evidence of Patient’s Preference of Life-Sustaining Treatment (Written or Oral): NOT KNOWN	   Resuscitation status:  DNR:  Yes   No      DNI:  Yes   No      Discussion: At this time the patient is a frail woman with severe dementia, cardiovascular disease and chronic renal insufficiency whose acute respiratory distress is responding to medical treatment. However her physical state is declining overall and hence she has an overall poor prognosis. She has a very caring and involved family and up until this admission had a quality of life that they support. It is unclear at the moment how this illness will affect this quality. She has a nasogastric tube for feeding as she has not yet recovered enough to be fed.    Meeting with Caron (pt’s daughter and Proxy) – Subsequent to the initial evaluation Dr. Leger spoke with the patient’s daughter, Caron.  The patient’s daughter explained that the patient, and their entire family, have deeply held Caodaism beliefs (Yazidism).  She stated also that the patient’s mother had also suffered with advanced dementia and lived with the family who cared for her for many years.  In fact, as a child Caron grew up in the home with her grandmother through the late stages of dementia.     Bioethics analysis: at odds here are our desire to uphold patient autonomy as expressed through substituted judgement by this patient’s surrogates and the moral distress clinicians face when they feel they are being coerced into violating the principle of nonmaleficence. This distress might be further exacerbated by the concern that that surrogates are not necessarily acting in accordance with the patient’s previously stated wishes or values.     In general, health care providers are under no obligation to perform any measure which is futile and that they feel would place an unacceptable burden on the patient.  However, since the patient currently seems to be recovering from an acute illness, further discussion with the daughters about their Goals of Care and reasonable expectations  could be beneficial. The medical ethics service is happy to facilitate such a meeting if needed.  Alternatively, the primary team could conduct this discussion independently, or consult palliative care for assistance.

## 2017-12-02 DIAGNOSIS — R78.81 BACTEREMIA: ICD-10-CM

## 2017-12-02 DIAGNOSIS — N18.9 CHRONIC KIDNEY DISEASE, UNSPECIFIED: ICD-10-CM

## 2017-12-02 LAB
-  CEFAZOLIN: SIGNIFICANT CHANGE UP
-  CIPROFLOXACIN: SIGNIFICANT CHANGE UP
-  CLINDAMYCIN: SIGNIFICANT CHANGE UP
-  DAPTOMYCIN: SIGNIFICANT CHANGE UP
-  ERYTHROMYCIN: SIGNIFICANT CHANGE UP
-  GENTAMICIN: SIGNIFICANT CHANGE UP
-  LINEZOLID: SIGNIFICANT CHANGE UP
-  MOXIFLOXACIN(AEROBIC): SIGNIFICANT CHANGE UP
-  OXACILLIN: SIGNIFICANT CHANGE UP
-  PENICILLIN: SIGNIFICANT CHANGE UP
-  RIFAMPIN.: SIGNIFICANT CHANGE UP
-  TETRACYCLINE: SIGNIFICANT CHANGE UP
-  TRIMETHOPRIM/SULFAMETHOXAZOLE: SIGNIFICANT CHANGE UP
-  VANCOMYCIN: SIGNIFICANT CHANGE UP
BACTERIA BLD CULT: SIGNIFICANT CHANGE UP
BACTERIA BLD CULT: SIGNIFICANT CHANGE UP
BUN SERPL-MCNC: 124 MG/DL — HIGH (ref 7–23)
CALCIUM SERPL-MCNC: 9.3 MG/DL — SIGNIFICANT CHANGE UP (ref 8.4–10.5)
CHLORIDE SERPL-SCNC: 126 MMOL/L — HIGH (ref 98–107)
CO2 SERPL-SCNC: 12 MMOL/L — LOW (ref 22–31)
CREAT SERPL-MCNC: 2.67 MG/DL — HIGH (ref 0.5–1.3)
GLUCOSE SERPL-MCNC: 216 MG/DL — HIGH (ref 70–99)
HCT VFR BLD CALC: 25.8 % — LOW (ref 34.5–45)
HGB BLD-MCNC: 8.3 G/DL — LOW (ref 11.5–15.5)
MCHC RBC-ENTMCNC: 31.4 PG — SIGNIFICANT CHANGE UP (ref 27–34)
MCHC RBC-ENTMCNC: 32.2 % — SIGNIFICANT CHANGE UP (ref 32–36)
MCV RBC AUTO: 97.7 FL — SIGNIFICANT CHANGE UP (ref 80–100)
METHOD TYPE: SIGNIFICANT CHANGE UP
MRSA SPEC QL CULT: SIGNIFICANT CHANGE UP
NRBC # FLD: 0 — SIGNIFICANT CHANGE UP
ORGANISM # SPEC MICROSCOPIC CNT: SIGNIFICANT CHANGE UP
PLATELET # BLD AUTO: 185 K/UL — SIGNIFICANT CHANGE UP (ref 150–400)
PMV BLD: 13.4 FL — HIGH (ref 7–13)
POTASSIUM SERPL-MCNC: 3.9 MMOL/L — SIGNIFICANT CHANGE UP (ref 3.5–5.3)
POTASSIUM SERPL-SCNC: 3.9 MMOL/L — SIGNIFICANT CHANGE UP (ref 3.5–5.3)
RBC # BLD: 2.64 M/UL — LOW (ref 3.8–5.2)
RBC # FLD: 14.2 % — SIGNIFICANT CHANGE UP (ref 10.3–14.5)
SODIUM SERPL-SCNC: 157 MMOL/L — HIGH (ref 135–145)
SPECIMEN SOURCE: SIGNIFICANT CHANGE UP
VANCOMYCIN FLD-MCNC: 18 UG/ML — SIGNIFICANT CHANGE UP
WBC # BLD: 21.63 K/UL — HIGH (ref 3.8–10.5)
WBC # FLD AUTO: 21.63 K/UL — HIGH (ref 3.8–10.5)

## 2017-12-02 PROCEDURE — 99223 1ST HOSP IP/OBS HIGH 75: CPT | Mod: GC

## 2017-12-02 PROCEDURE — 99222 1ST HOSP IP/OBS MODERATE 55: CPT | Mod: GC

## 2017-12-02 RX ORDER — POLYETHYLENE GLYCOL 3350 17 G/17G
17 POWDER, FOR SOLUTION ORAL
Qty: 0 | Refills: 0 | Status: DISCONTINUED | OUTPATIENT
Start: 2017-12-02 | End: 2017-12-09

## 2017-12-02 RX ORDER — VANCOMYCIN HCL 1 G
750 VIAL (EA) INTRAVENOUS ONCE
Qty: 0 | Refills: 0 | Status: COMPLETED | OUTPATIENT
Start: 2017-12-02 | End: 2017-12-02

## 2017-12-02 RX ADMIN — Medication 150 MILLIGRAM(S): at 20:44

## 2017-12-02 RX ADMIN — OXYCODONE AND ACETAMINOPHEN 1 TABLET(S): 5; 325 TABLET ORAL at 00:48

## 2017-12-02 RX ADMIN — Medication 3 MILLIGRAM(S): at 06:59

## 2017-12-02 RX ADMIN — HEPARIN SODIUM 5000 UNIT(S): 5000 INJECTION INTRAVENOUS; SUBCUTANEOUS at 15:24

## 2017-12-02 RX ADMIN — Medication 650 MILLIGRAM(S): at 18:58

## 2017-12-02 RX ADMIN — POLYETHYLENE GLYCOL 3350 17 GRAM(S): 17 POWDER, FOR SOLUTION ORAL at 15:37

## 2017-12-02 RX ADMIN — Medication 100 MILLIGRAM(S): at 06:59

## 2017-12-02 RX ADMIN — HEPARIN SODIUM 5000 UNIT(S): 5000 INJECTION INTRAVENOUS; SUBCUTANEOUS at 21:57

## 2017-12-02 RX ADMIN — Medication 3: at 18:18

## 2017-12-02 RX ADMIN — Medication 37 MICROGRAM(S): at 07:01

## 2017-12-02 RX ADMIN — Medication 650 MILLIGRAM(S): at 20:00

## 2017-12-02 RX ADMIN — OXYCODONE AND ACETAMINOPHEN 1 TABLET(S): 5; 325 TABLET ORAL at 01:18

## 2017-12-02 RX ADMIN — HEPARIN SODIUM 5000 UNIT(S): 5000 INJECTION INTRAVENOUS; SUBCUTANEOUS at 06:59

## 2017-12-02 RX ADMIN — Medication 100 MILLIGRAM(S): at 15:24

## 2017-12-02 RX ADMIN — Medication 3 MILLIGRAM(S): at 15:23

## 2017-12-02 RX ADMIN — Medication 2: at 12:37

## 2017-12-02 RX ADMIN — Medication 100 MILLIGRAM(S): at 21:57

## 2017-12-02 RX ADMIN — Medication 5 MILLIGRAM(S): at 15:23

## 2017-12-02 RX ADMIN — SENNA PLUS 2 TABLET(S): 8.6 TABLET ORAL at 21:57

## 2017-12-02 RX ADMIN — Medication: at 09:00

## 2017-12-02 NOTE — PROGRESS NOTE ADULT - SUBJECTIVE AND OBJECTIVE BOX
TRANSFER NOTE    HPI  89F with severe dementia and kidney disease presented to ED wtih left facial swelling, lethargy, noisy breathing, and leukocytosis.  Pt was found to have left parotitis and on airway exam had significant supragottic edema.  She was admitted to SICU for airway watch where her airway edema resolved.  She continues medical management of her sialadenitis which is steadily improving.  She was found to have MRSA positive blood cultures for which ID is following.  The patient is full code per the families wishes.    PMH above  PSH: none reported  Meds: per chart  Allergies: NKDA    Exam  VSS  NAD, awake  Breathing comfortably on face tent  No stridor at rest  Left parotid and submandibular gland with induration, swelling and tenderness to palpation  Stable compared to yesterday  Patient will not cooperate with oral exam      a/p 89F with left parotitis improving on Abx.  Positive blood cultures.   -accepted for transfer to medicine service  -pain control  -parotid and submandibular massage, warm compresses q2-3 hrs  -iv vanc by dose  -f/u vanc, AM labs  -f/u BCx  - f/u ID  -wean decadron  -tube feeds  -voiding freely  -oob  -will d/w attending

## 2017-12-02 NOTE — CONSULT NOTE ADULT - SUBJECTIVE AND OBJECTIVE BOX
89F with end stage dementia, CKD Stage 3 (baseline Cr 1.75), hypothyroidism, frequent UTIs who presented to [facility] on 11/28/17 with fever and left facial swelling. Nonverbal, bedbound. Of note, patient was diagnosed with UTI the day prior to admission and started on Levofloxacin. In the ED febrile to 100.5, tachycardic. Underwent CT Neck which revealed left-sided facial and salivary gland edema. Patient was then admitted to SICU for airway monitoring however airway improved and transferred to ENT service.     Patient was then found to be bacteremic with presumed parotitis as the source of infection. Patient severely demented, unable to provide thorough history and ENT team was concerned that treatment and invasive procedures on patient would be a violation of the principal of nonmaleficence. Daughter, caretaker, however does want to continue full resuscitation and all measures despite grave prognosis.    Patient currently on decadron taper, and on vancomycin by level in the context of her CKD. ID currently following. 89F with end stage dementia, CKD Stage 3 (baseline Cr 1.75), hypothyroidism, frequent UTIs who presented to [facility] on 11/28/17 with fever and left facial swelling. Nonverbal, bedbound. Of note, patient was diagnosed with UTI the day prior to admission and started on Levofloxacin. In the ED febrile to 100.5, tachycardic. Underwent CT Neck which revealed left-sided facial and salivary gland edema. Patient was then admitted to SICU for airway monitoring however airway improved and transferred to ENT service.     Patient was then found to be bacteremic with presumed parotitis as the source of infection. Patient severely demented, unable to provide thorough history and ENT team was concerned that treatment and invasive procedures on patient would be a violation of the principal of nonmaleficence. Daughter, caretaker, however does want to continue full resuscitation and all measures despite grave prognosis.    Patient currently on decadron taper, and on vancomycin by level in the context of her CKD. ID currently following.      PAST MEDICAL & SURGICAL HISTORY:  UTI (urinary tract infection) due to Enterococcus  CKD (chronic kidney disease)  Breast cancer  Hypothyroid  Dementia  No significant past surgical history    FAMILY HISTORY:  No pertinent family history in first degree relatives    Social History: At home with daughter    MEDICATIONS  (STANDING):  dexamethasone  Injectable 3 milliGRAM(s) IV Push every 8 hours  dextrose 5%. 1000 milliLiter(s) (50 mL/Hr) IV Continuous <Continuous>  dextrose 50% Injectable 12.5 Gram(s) IV Push once  dextrose 50% Injectable 12.5 Gram(s) IV Push once  dextrose 50% Injectable 25 Gram(s) IV Push once  dextrose 50% Injectable 25 Gram(s) IV Push once  docusate sodium 100 milliGRAM(s) Oral three times a day  heparin  Injectable 5000 Unit(s) SubCutaneous every 8 hours  influenza   Vaccine 0.5 milliLiter(s) IntraMuscular once  insulin lispro (HumaLOG) corrective regimen sliding scale   SubCutaneous three times a day before meals  lactated ringers. 1000 milliLiter(s) (100 mL/Hr) IV Continuous <Continuous>  levothyroxine Injectable 37 MICROGram(s) IV Push daily  senna 2 Tablet(s) Oral at bedtime    MEDICATIONS  (PRN):  acetaminophen   Tablet. 325 milliGRAM(s) Oral every 6 hours PRN Mild Pain (1 - 3)  acetaminophen   Tablet. 650 milliGRAM(s) Oral every 6 hours PRN Moderate Pain (4 - 6)  bisacodyl 5 milliGRAM(s) Oral every 12 hours PRN Constipation  dextrose Gel 1 Dose(s) Oral once PRN Blood Glucose LESS THAN 70 milliGRAM(s)/deciliter  glucagon  Injectable 1 milliGRAM(s) IntraMuscular once PRN Glucose LESS THAN 70 milligrams/deciliter  levalbuterol Inhalation 1.25 milliGRAM(s) Inhalation two times a day PRN shortness of breath and/or wheezing  oxyCODONE    5 mG/acetaminophen 325 mG 1 Tablet(s) Oral every 6 hours PRN Severe Pain (7 - 10)    Allergies  No Known Allergies    Review of Systems:  UNABLE TO OBTAIN due to dementia, advanced.    PHYSICAL EXAM:  VITALS: T(C): 36.4 (12-02-17 @ 06:55)  T(F): 97.6 (12-02-17 @ 06:55), Max: 98 (12-02-17 @ 02:51)  HR: 73 (12-02-17 @ 06:55) (73 - 97)  BP: 141/69 (12-02-17 @ 06:55) (137/62 - 159/68)  RR:  (16 - 20)  SpO2:  (100% - 100%)    General: Awake but not alert , NAD  HEENT:  Enlarged and firm parotid and submandibular glands  Neck: Supple, Soft  Cardiac: RRR, 3/6 SABINE, No G/R  Resp: CTAB, No w/r/r  Abdomen: Soft non tender nondistended no grimacing on palpation  MSK: Contracted UE and LE, No LE edema.  Neuro: AAOx0  Psych: Affect and mood non appropriate               8.3    21.63 )-----------( 185      ( 02 Dec 2017 06:50 )             25.8       12-02    157<H>  |  126<H>  |  124<H>  ----------------------------<  216<H>  3.9   |  12<L>  |  2.67<H>    EGFR if : 18  EGFR if non : 15    Ca    9.3      12-02  Mg     2.7     12-01  Phos  7.2     12-01    Hemoglobin A1C, Whole Blood: 4.8 % [4.0 - 5.6] (11-30-17 @ 03:40)    Radiology:   < from: Xray Chest 1 View AP- PORTABLE-Urgent (11.30.17 @ 13:06) >  The heart is not enlarged.  Enteric tube extends into the left hemiabdomen. The end is not included   on the image.  The lungs appear clear. Skin folds overlie both the right and left chest.  No pleural effusion is seen.  < end of copied text >    < from: CT Neck Soft Tissue No Cont (11.28.17 @ 20:19) >  Extensive haziness along the soft tissues and fat planes of the left   face, left oropharynx and circumferentially along theremainder of the   neck. There is fullness along the left oropharynx with haziness along the   left paratracheal fat which may indicate a potential site of infection   although evaluation is extremely limited due to the lack of intravenous   contrast. Correlation with direct laryngoscopy is recommended.   Alternatively, infection may be centered along the left   parotid/submandibular region as there is asymmetric soft tissue swelling   in this area as well. The epiglottis is slightly thickened and clinical   evaluation of the airway is recommended. There is a thin retropharyngeal   effusion.  < end of copied text >

## 2017-12-02 NOTE — CONSULT NOTE ADULT - SUBJECTIVE AND OBJECTIVE BOX
HPI: 89F with end stage dementia, CKD Stage 3 (baseline Cr 1.75), hypothyroidism, frequent UTIs who presented to [facility] on 11/28/17 with fever and left facial swelling. History was limited to chart review as patient was nonverbal at baseline. Of note, patient was diagnosed with UTI the day prior to admission and started on Levofloxacin. In the ED febrile to 100.5, tachycardic to > 90 but normotensive. WBC of 26.27 on presentation. U/A with > 50 WBC. RVP negative. Underwent CT Neck which revealed left-sided facial and salivary gland edema. Started on IV decadron given concern for her airway as patient had stridor on presentation. Initially given Vanco/Clinda/Imipenem.  Patient was monitored in SICU given tenuous airway status. Continued on Vanco/Unasyn. Blood cultures from admission resulted with MRSA. Unasyn discontinued on 12/1.      PAST MEDICAL & SURGICAL HISTORY:  UTI (urinary tract infection) due to Enterococcus  CKD (chronic kidney disease)  Breast cancer  Hypothyroid  Dementia  No significant past surgical history      Allergies  No Known Allergies        ANTIMICROBIALS:      OTHER MEDS: MEDICATIONS  (STANDING):  acetaminophen   Tablet. 325 every 6 hours PRN  acetaminophen   Tablet. 650 every 6 hours PRN  bisacodyl 5 every 12 hours PRN  dexamethasone  Injectable 3 every 8 hours  dextrose 50% Injectable 12.5 once  dextrose 50% Injectable 12.5 once  dextrose 50% Injectable 25 once  dextrose 50% Injectable 25 once  dextrose Gel 1 once PRN  docusate sodium 100 three times a day  glucagon  Injectable 1 once PRN  heparin  Injectable 5000 every 8 hours  influenza   Vaccine 0.5 once  insulin lispro (HumaLOG) corrective regimen sliding scale  three times a day before meals  levalbuterol Inhalation 1.25 two times a day PRN  levothyroxine Injectable 37 daily  oxyCODONE    5 mG/acetaminophen 325 mG 1 every 6 hours PRN  senna 2 at bedtime      SOCIAL HISTORY:  [ ] etoh [ ] tobacco [ ] former smoker [ ] IVDU    FAMILY HISTORY:  No pertinent family history in first degree relatives      REVIEW OF SYSTEMS  [  ] ROS unobtainable because:    [  ] All other systems negative except as noted below:	    Constitutional:  [ ] fever [ ] weight loss  Skin:  [ ] rash [ ] phlebitis	  Eyes: [ ] icterus [ ] inflammation	  ENMT: [ ] discharge [ ] thrush [ ] ulcers [ ] exudates  Respiratory: [ ] dyspnea [ ] hemoptysis [ ] cough [ ] sputum	  Cardiovascular:  [ ] chest pain [ ] palpitations [ ] edema	  Gastrointestinal:  [ ] nausea [ ] vomiting [ ] diarrhea [ ] constipation [ ] pain	  Genitourinary:  [ ] dysuria [ ] frequency [ ] hematuria [ ] discharge [ ] flank pain  Musculoskeletal:  [ ] myalgias [ ] arthralgias [ ] arthritis	  Neurological:  [ ] headache [ ] seizures	  Psychiatric:  [ ] anxiety [ ] depression	  Hematology/Lymphatics:  [ ] lymphadenopathy  Endocrine:  [ ] adrenal [ ] thyroid  Allergic/Immunologic:	 [ ] transplant [ ] seasonal    Vital Signs Last 24 Hrs  T(F): 97.6 (12-02-17 @ 06:55), Max: 101.5 (11-29-17 @ 03:15)    Vital Signs Last 24 Hrs  HR: 73 (12-02-17 @ 06:55) (73 - 97)  BP: 141/69 (12-02-17 @ 06:55) (137/62 - 159/68)  RR: 16 (12-02-17 @ 06:55)  SpO2: 100% (12-02-17 @ 06:55) (100% - 100%)  Wt(kg): --    PHYSICAL EXAM:  General: non-toxic  HEAD/EYES: anicteric, PERRL  ENT:  supple  Cardiovascular:   S1, S2  Respiratory:  clear bilaterally  GI:  soft, non-tender, normal bowel sounds  :  no CVA tenderness   Musculoskeletal:  no synovitis  Neurologic:  grossly non-focal  Skin:  no rash  Lymph: no lymphadenopathy  Psychiatric:  appropriate affect  Vascular:  no phlebitis                                8.7    25.43 )-----------( 178      ( 01 Dec 2017 07:09 )             26.4       12-01    154<H>  |  121<H>  |  131<H>  ----------------------------<  133<H>  3.8   |  11<L>  |  2.44<H>    Ca    9.5      01 Dec 2017 07:09  Phos  7.2     12-01  Mg     2.7     12-01            MICROBIOLOGY:  URINE CATHETER  11-29-17 --  --  --      URINE CATHETER  11-28-17 --  --  --      BLOOD PERIPHERAL  11-28-17 --  --  BLOOD CULTURE PCR  Staphylococcus aureus    RADIOLOGY:    EXAM:  RAD CHEST PORTABLE URGENT    PROCEDURE DATE:  Nov 30 2017   The heart is not enlarged.  Enteric tube extends into the left hemiabdomen. The end is not included on the image.  The lungs appear clear. Skin folds overlie both the right and left chest.  No pleural effusion is seen.    EXAM:  CT NECK SOFT TISSUE    PROCEDURE DATE:  Nov 28 2017   Extensive haziness along the soft tissues and fat planes of the left face, left oropharynx and circumferentially along the remainder of the neck. There is fullness along the left oropharynx with haziness along the left paratracheal fat which may indicate a potential site of infection although evaluation is extremely limited due to the lack of intravenous contrast.   Correlation with direct laryngoscopy is recommended.   Alternatively, infection may be centered along the left parotid/submandibular region as there is asymmetric soft tissue swelling in this area as well. The epiglottis is slightly thickened and clinical evaluation of the airway is recommended. There is a thin retropharyngeal effusion. HPI: 89F with end stage dementia, CKD Stage 3 (baseline Cr 1.75), hypothyroidism, frequent UTIs who presented to [facility] on 11/28/17 with fever and left facial swelling. History was limited to chart review as patient was nonverbal at baseline. Of note, patient was diagnosed with UTI the day prior to admission and started on Levofloxacin. In the ED febrile to 100.5, tachycardic to > 90 but normotensive. WBC of 26.27 on presentation. U/A with > 50 WBC. RVP negative. Underwent CT Neck which revealed left-sided facial and salivary gland edema. Started on IV decadron given concern for her airway as patient had stridor on presentation. Initially given Vanco/Clinda/Imipenem.  Patient was monitored in SICU given tenuous airway status. Continued on Vanco/Unasyn. Blood cultures from admission resulted with MRSA. Unasyn discontinued on 12/1.  Per nursing staff no BM for 2 days.     PAST MEDICAL & SURGICAL HISTORY:  UTI (urinary tract infection) due to Enterococcus  CKD (chronic kidney disease)  Breast cancer  Hypothyroid  Dementia  No significant past surgical history      Allergies  No Known Allergies        ANTIMICROBIALS:      OTHER MEDS: MEDICATIONS  (STANDING):  acetaminophen   Tablet. 325 every 6 hours PRN  acetaminophen   Tablet. 650 every 6 hours PRN  bisacodyl 5 every 12 hours PRN  dexamethasone  Injectable 3 every 8 hours  dextrose 50% Injectable 12.5 once  dextrose 50% Injectable 12.5 once  dextrose 50% Injectable 25 once  dextrose 50% Injectable 25 once  dextrose Gel 1 once PRN  docusate sodium 100 three times a day  glucagon  Injectable 1 once PRN  heparin  Injectable 5000 every 8 hours  influenza   Vaccine 0.5 once  insulin lispro (HumaLOG) corrective regimen sliding scale  three times a day before meals  levalbuterol Inhalation 1.25 two times a day PRN  levothyroxine Injectable 37 daily  oxyCODONE    5 mG/acetaminophen 325 mG 1 every 6 hours PRN  senna 2 at bedtime      SOCIAL HISTORY:  unable to obtain as patient is nonverbal at baseline.     FAMILY HISTORY:  No pertinent family history in first degree relatives      REVIEW OF SYSTEMS  [ x ] ROS unobtainable because:  patient nonverbal at baseline  [  ] All other systems negative except as noted below:	    Constitutional:  [ ] fever [ ] weight loss  Skin:  [ ] rash [ ] phlebitis	  Eyes: [ ] icterus [ ] inflammation	  ENMT: [ ] discharge [ ] thrush [ ] ulcers [ ] exudates  Respiratory: [ ] dyspnea [ ] hemoptysis [ ] cough [ ] sputum	  Cardiovascular:  [ ] chest pain [ ] palpitations [ ] edema	  Gastrointestinal:  [ ] nausea [ ] vomiting [ ] diarrhea [ ] constipation [ ] pain	  Genitourinary:  [ ] dysuria [ ] frequency [ ] hematuria [ ] discharge [ ] flank pain  Musculoskeletal:  [ ] myalgias [ ] arthralgias [ ] arthritis	  Neurological:  [ ] headache [ ] seizures	  Psychiatric:  [ ] anxiety [ ] depression	  Hematology/Lymphatics:  [ ] lymphadenopathy  Endocrine:  [ ] adrenal [ ] thyroid  Allergic/Immunologic:	 [ ] transplant [ ] seasonal    Vital Signs Last 24 Hrs  T(F): 97.6 (12-02-17 @ 06:55), Max: 101.5 (11-29-17 @ 03:15)    Vital Signs Last 24 Hrs  HR: 73 (12-02-17 @ 06:55) (73 - 97)  BP: 141/69 (12-02-17 @ 06:55) (137/62 - 159/68)  RR: 16 (12-02-17 @ 06:55)  SpO2: 100% (12-02-17 @ 06:55) (100% - 100%)  Wt(kg): --    PHYSICAL EXAMINATION:  General: Awake but not alert , NAD  HEENT: Unable to assess eyes as patient resists exam. MMM appear dry but patient resists exam. Enlarged and firm parotid and submandibular glands  Neck: Supple  Cardiac: RRR, 3/6 SABINE, No G/R  Resp: CTAB, No Wh/Rh/Ra  Abdomen: NBS, Mildly distended, firm No HSM, No rigidity or guarding  MSK: Contracted UE and LE, No LE edema. No stigmata of IE. No evidence of phlebitis. No evidence of synovitis.  Back: Coccyx with stage 2 pressure ulcer (No surrounding erythema, drainage or tenderness to palpation)  Skin: Pressure ulcer on sacrum. No otherrashes or lesions. Skin is warm and dry to the touch.   Neuro: Awake but not alert. Contracted UE and LE  Psych: Patient is nonverbal at baseline                          8.7    25.43 )-----------( 178      ( 01 Dec 2017 07:09 )             26.4       12-01    154<H>  |  121<H>  |  131<H>  ----------------------------<  133<H>  3.8   |  11<L>  |  2.44<H>    Ca    9.5      01 Dec 2017 07:09  Phos  7.2     12-01  Mg     2.7     12-01            MICROBIOLOGY:  URINE CATHETER  11-29-17 --  --  --      URINE CATHETER  11-28-17 --  --  --      BLOOD PERIPHERAL  11-28-17 --  --  BLOOD CULTURE PCR  Staphylococcus aureus    RADIOLOGY:    EXAM:  RAD CHEST PORTABLE URGENT    PROCEDURE DATE:  Nov 30 2017   The heart is not enlarged.  Enteric tube extends into the left hemiabdomen. The end is not included on the image.  The lungs appear clear. Skin folds overlie both the right and left chest.  No pleural effusion is seen.    EXAM:  CT NECK SOFT TISSUE    PROCEDURE DATE:  Nov 28 2017   Extensive haziness along the soft tissues and fat planes of the left face, left oropharynx and circumferentially along the remainder of the neck. There is fullness along the left oropharynx with haziness along the left paratracheal fat which may indicate a potential site of infection although evaluation is extremely limited due to the lack of intravenous contrast.   Correlation with direct laryngoscopy is recommended.   Alternatively, infection may be centered along the left parotid/submandibular region as there is asymmetric soft tissue swelling in this area as well. The epiglottis is slightly thickened and clinical evaluation of the airway is recommended. There is a thin retropharyngeal effusion.

## 2017-12-02 NOTE — CONSULT NOTE ADULT - ATTENDING COMMENTS
89 year old female with CKD, dementia presents with fever and facial swelling.    Exam and imaging consistent with parotitis/ siloadenitis as focus of MRSA bacteremia    Check a vanco level at 4:30 pm, if less than 20 - start vancomycin 750 mg iv daily.   If higher than 20, repeat vanco level in the am.    Patient's with Staph aurues bacteremia should have reat blood cultures at 40 hours- rpeat cultures are now testing.    Check TTE, if culture do not clear may need BARON.    If WBC does not improve, may need to repeat ct neck in a few days.    Overall prognosis is grave due to her underlying comorbidities.

## 2017-12-02 NOTE — PROGRESS NOTE ADULT - SUBJECTIVE AND OBJECTIVE BOX
Patient seen and examined this AM  Yesterday patient's blood cx grew MRSA. ID consulted. Repeat BCx pending. Pt also with urinary retention s/p straight catheterization    Exam  VSS  NAD, awake  Breathing comfortably on face tent  No stridor at rest  Left parotid and submandibular gland with induration, swelling and tenderness to palpation  Stable compared to yesterday  Patient will not cooperate with oral exam      a/p 89F with left parotid and submandibular adenitis  -pain control  -parotid and submandibular massage, warm compresses q2-3 hrs  -iv vanc  - f/u vanc, AM labs  -f/u BCx  - f/u ID  -wean decadron  -tube feeds  -trial of void  -oob  -will d/w attending

## 2017-12-02 NOTE — CONSULT NOTE ADULT - ASSESSMENT
89F with end stage dementia, CKD Stage 3 (baseline Cr 1.75), hypothyroidism, frequent UTIs who presented to [facility] on 11/28/17 with fever and left facial swelling. CT Neck which revealed left-sided facial and salivary gland edema. Started on IV decadron given concern for her airway as patient had stridor on presentation. Initially given Vanco/Clinda/Imipenem.  Patient was monitored in SICU given tenuous airway status. Continued on Vanco/Unasyn. Blood cultures from admission resulted with MRSA. Source for bacteremia at this time appears to be sialadenitis/parotitis. 89F with end stage dementia, CKD Stage 3 (baseline Cr 1.75), hypothyroidism, frequent UTIs who presented to [facility] on 11/28/17 with fever and left facial swelling. CT Neck which revealed left-sided facial and salivary gland edema. Started on IV decadron given concern for her airway as patient had stridor on presentation. Initially given Vanco/Clinda/Imipenem.  Patient was monitored in SICU given tenuous airway status. Continued on Vanco/Unasyn. Blood cultures from admission resulted with MRSA. Source for bacteremia at this time appears to be sialadenitis/parotitis. Would continue on vancomycin with plan for level at 4:30 PM today. Would followup on repeat blood cultures  --Obtain Vancomycin level today at 4:30 PM  --If level < 20 plan to dose Vancomycin 750 mg tonight  --F/U repeat blood cultures

## 2017-12-03 DIAGNOSIS — R73.9 HYPERGLYCEMIA, UNSPECIFIED: ICD-10-CM

## 2017-12-03 DIAGNOSIS — E87.0 HYPEROSMOLALITY AND HYPERNATREMIA: ICD-10-CM

## 2017-12-03 DIAGNOSIS — N18.4 CHRONIC KIDNEY DISEASE, STAGE 4 (SEVERE): ICD-10-CM

## 2017-12-03 DIAGNOSIS — E43 UNSPECIFIED SEVERE PROTEIN-CALORIE MALNUTRITION: ICD-10-CM

## 2017-12-03 DIAGNOSIS — E87.2 ACIDOSIS: ICD-10-CM

## 2017-12-03 DIAGNOSIS — R78.81 BACTEREMIA: ICD-10-CM

## 2017-12-03 DIAGNOSIS — K11.21 ACUTE SIALOADENITIS: ICD-10-CM

## 2017-12-03 LAB
BUN SERPL-MCNC: 121 MG/DL — HIGH (ref 7–23)
CALCIUM SERPL-MCNC: 9.3 MG/DL — SIGNIFICANT CHANGE UP (ref 8.4–10.5)
CHLORIDE SERPL-SCNC: 129 MMOL/L — HIGH (ref 98–107)
CO2 SERPL-SCNC: 12 MMOL/L — LOW (ref 22–31)
CREAT SERPL-MCNC: 2.68 MG/DL — HIGH (ref 0.5–1.3)
GLUCOSE SERPL-MCNC: 226 MG/DL — HIGH (ref 70–99)
MAGNESIUM SERPL-MCNC: 2.5 MG/DL — SIGNIFICANT CHANGE UP (ref 1.6–2.6)
PHOSPHATE SERPL-MCNC: 5.5 MG/DL — HIGH (ref 2.5–4.5)
POTASSIUM SERPL-MCNC: 3.9 MMOL/L — SIGNIFICANT CHANGE UP (ref 3.5–5.3)
POTASSIUM SERPL-SCNC: 3.9 MMOL/L — SIGNIFICANT CHANGE UP (ref 3.5–5.3)
SODIUM SERPL-SCNC: 159 MMOL/L — HIGH (ref 135–145)
SPECIMEN SOURCE: SIGNIFICANT CHANGE UP
VANCOMYCIN FLD-MCNC: 21 UG/ML — SIGNIFICANT CHANGE UP
VANCOMYCIN FLD-MCNC: 24.7 UG/ML — SIGNIFICANT CHANGE UP

## 2017-12-03 PROCEDURE — 99233 SBSQ HOSP IP/OBS HIGH 50: CPT

## 2017-12-03 PROCEDURE — 99232 SBSQ HOSP IP/OBS MODERATE 35: CPT

## 2017-12-03 RX ORDER — SODIUM CHLORIDE 9 MG/ML
1000 INJECTION INTRAMUSCULAR; INTRAVENOUS; SUBCUTANEOUS
Qty: 0 | Refills: 0 | Status: DISCONTINUED | OUTPATIENT
Start: 2017-12-03 | End: 2017-12-03

## 2017-12-03 RX ORDER — SODIUM BICARBONATE 1 MEQ/ML
650 SYRINGE (ML) INTRAVENOUS
Qty: 0 | Refills: 0 | Status: DISCONTINUED | OUTPATIENT
Start: 2017-12-03 | End: 2017-12-09

## 2017-12-03 RX ORDER — CALCIUM ACETATE 667 MG
667 TABLET ORAL
Qty: 0 | Refills: 0 | Status: DISCONTINUED | OUTPATIENT
Start: 2017-12-03 | End: 2017-12-03

## 2017-12-03 RX ORDER — INSULIN GLARGINE 100 [IU]/ML
5 INJECTION, SOLUTION SUBCUTANEOUS AT BEDTIME
Qty: 0 | Refills: 0 | Status: DISCONTINUED | OUTPATIENT
Start: 2017-12-03 | End: 2017-12-05

## 2017-12-03 RX ORDER — SODIUM CHLORIDE 9 MG/ML
1000 INJECTION, SOLUTION INTRAVENOUS
Qty: 0 | Refills: 0 | Status: DISCONTINUED | OUTPATIENT
Start: 2017-12-03 | End: 2017-12-04

## 2017-12-03 RX ADMIN — Medication 1: at 17:38

## 2017-12-03 RX ADMIN — Medication 37 MICROGRAM(S): at 05:01

## 2017-12-03 RX ADMIN — INSULIN GLARGINE 5 UNIT(S): 100 INJECTION, SOLUTION SUBCUTANEOUS at 23:17

## 2017-12-03 RX ADMIN — Medication 100 MILLIGRAM(S): at 05:01

## 2017-12-03 RX ADMIN — HEPARIN SODIUM 5000 UNIT(S): 5000 INJECTION INTRAVENOUS; SUBCUTANEOUS at 05:01

## 2017-12-03 RX ADMIN — Medication 2: at 08:54

## 2017-12-03 RX ADMIN — Medication 100 MILLIGRAM(S): at 13:47

## 2017-12-03 RX ADMIN — HEPARIN SODIUM 5000 UNIT(S): 5000 INJECTION INTRAVENOUS; SUBCUTANEOUS at 13:47

## 2017-12-03 RX ADMIN — HEPARIN SODIUM 5000 UNIT(S): 5000 INJECTION INTRAVENOUS; SUBCUTANEOUS at 23:17

## 2017-12-03 RX ADMIN — SODIUM CHLORIDE 75 MILLILITER(S): 9 INJECTION, SOLUTION INTRAVENOUS at 13:47

## 2017-12-03 RX ADMIN — Medication: at 12:46

## 2017-12-03 NOTE — PROGRESS NOTE ADULT - SUBJECTIVE AND OBJECTIVE BOX
Patient seen and examined. No acute events. Transferred to medicine service. Instructed family member at bedside how to perform gland massage.      Exam  VSS  NAD, awake  Breathing comfortably on RA  No stridor at rest  Left parotid and submandibular gland with induration, swelling and tenderness to palpation (improving)  Patient will not cooperate with oral exam      a/p 89F with left parotitis improving on Abx. Blood CX +MRSA  -pain control  -cont parotid and submandibular massage, warm compresses q2-3 hrs  -cont Abx per ID  -remainder of care per medicine  -call with questions

## 2017-12-03 NOTE — PROGRESS NOTE ADULT - SUBJECTIVE AND OBJECTIVE BOX
Follow Up:      Inverval History/ROS:Patient is a 89y old  Female who presents with parotitis/ MRSA bacteremia.    No fever    No Known Allergies    Intolerances        ANTIMICROBIALS:      OTHER MEDS:  acetaminophen   Tablet. 325 milliGRAM(s) Oral every 6 hours PRN  acetaminophen   Tablet. 650 milliGRAM(s) Oral every 6 hours PRN  bisacodyl 5 milliGRAM(s) Oral every 12 hours PRN  dextrose 5% + sodium chloride 0.3%. 1000 milliLiter(s) IV Continuous <Continuous>  dextrose 5%. 1000 milliLiter(s) IV Continuous <Continuous>  dextrose 50% Injectable 12.5 Gram(s) IV Push once  dextrose 50% Injectable 12.5 Gram(s) IV Push once  dextrose 50% Injectable 25 Gram(s) IV Push once  dextrose 50% Injectable 25 Gram(s) IV Push once  dextrose Gel 1 Dose(s) Oral once PRN  docusate sodium 100 milliGRAM(s) Oral three times a day  glucagon  Injectable 1 milliGRAM(s) IntraMuscular once PRN  heparin  Injectable 5000 Unit(s) SubCutaneous every 8 hours  influenza   Vaccine 0.5 milliLiter(s) IntraMuscular once  insulin glargine Injectable (LANTUS) 5 Unit(s) SubCutaneous at bedtime  insulin lispro (HumaLOG) corrective regimen sliding scale   SubCutaneous three times a day before meals  levalbuterol Inhalation 1.25 milliGRAM(s) Inhalation two times a day PRN  levothyroxine Injectable 37 MICROGram(s) IV Push daily  oxyCODONE    5 mG/acetaminophen 325 mG 1 Tablet(s) Oral every 6 hours PRN  polyethylene glycol 3350 17 Gram(s) Oral two times a day PRN  senna 2 Tablet(s) Oral at bedtime  sodium bicarbonate 650 milliGRAM(s) Oral two times a day      Vital Signs Last 24 Hrs  T(C): 36.8 (03 Dec 2017 10:00), Max: 36.9 (02 Dec 2017 21:39)  T(F): 98.2 (03 Dec 2017 10:00), Max: 98.5 (03 Dec 2017 05:00)  HR: 73 (03 Dec 2017 10:00) (73 - 90)  BP: 138/86 (03 Dec 2017 10:00) (132/78 - 151/77)  BP(mean): --  RR: 17 (03 Dec 2017 10:00) (16 - 18)  SpO2: 94% (03 Dec 2017 10:00) (94% - 98%)    PHYSICAL EXAM:  General: [ ] non-toxic  HEAD/EYES: [ ] PERRL [ x] white sclera [ ] icterus  ENT:  [ ] normal [x ] supple [ ] thrush [ ] pharyngeal exudate  Cardiovascular:   [ ] murmur [x ] normal [ ] PPM/AICD  Respiratory:  [ ]x clear to ausculation bilaterally  GI:  [ x] soft, non-tender, normal bowel sounds  :  [ ] ewing [ ] no CVA tenderness   Musculoskeletal:  [ ] no synovitis  Neurologic:  [ ] non-focal exam   Skin:  [ ] no rash  Lymph: [ ] no lymphadenopathy  Psychiatric:  [ ] appropriate affect [ ] alert & oriented  Lines:  [x ] no phlebitis [ ] central line                                8.3    21.63 )-----------( 185      ( 02 Dec 2017 06:50 )             25.8       12-03    159<H>  |  129<H>  |  121<H>  ----------------------------<  226<H>  3.9   |  12<L>  |  2.68<H>    Ca    9.3      03 Dec 2017 06:04  Phos  5.5     12-03  Mg     2.5     12-03            MICROBIOLOGY:  Culture - Blood (12.02.17 @ 01:09)    Culture - Blood:   NO ORGANISMS ISOLATED  NO ORGANISMS ISOLATED AT 24 HOURS    Specimen Source: BLOOD      RADIOLOGY:

## 2017-12-03 NOTE — PROGRESS NOTE ADULT - SUBJECTIVE AND OBJECTIVE BOX
Patient is a 89y old  Female who presents with a chief complaint of UTI and face swelling (01 Dec 2017 07:19)      SUBJECTIVE / OVERNIGHT EVENTS: Pt unable to participate in ROS given advanced dementia.  Pt's daughter at bedside.  Pt remains afebrile past 24 hours, still with L cheek swelling, which as per daughter as improved somewhat.  Family has been performing cheek massage, pt still appears in some discomfort.       MEDICATIONS  (STANDING):  dextrose 5%. 1000 milliLiter(s) (50 mL/Hr) IV Continuous <Continuous>  dextrose 50% Injectable 12.5 Gram(s) IV Push once  dextrose 50% Injectable 12.5 Gram(s) IV Push once  dextrose 50% Injectable 25 Gram(s) IV Push once  dextrose 50% Injectable 25 Gram(s) IV Push once  docusate sodium 100 milliGRAM(s) Oral three times a day  heparin  Injectable 5000 Unit(s) SubCutaneous every 8 hours  influenza   Vaccine 0.5 milliLiter(s) IntraMuscular once  insulin lispro (HumaLOG) corrective regimen sliding scale   SubCutaneous three times a day before meals  levothyroxine Injectable 37 MICROGram(s) IV Push daily  senna 2 Tablet(s) Oral at bedtime  sodium chloride 0.225%. 1000 milliLiter(s) (75 mL/Hr) IV Continuous <Continuous>    MEDICATIONS  (PRN):  acetaminophen   Tablet. 325 milliGRAM(s) Oral every 6 hours PRN Mild Pain (1 - 3)  acetaminophen   Tablet. 650 milliGRAM(s) Oral every 6 hours PRN Moderate Pain (4 - 6)  bisacodyl 5 milliGRAM(s) Oral every 12 hours PRN Constipation  dextrose Gel 1 Dose(s) Oral once PRN Blood Glucose LESS THAN 70 milliGRAM(s)/deciliter  glucagon  Injectable 1 milliGRAM(s) IntraMuscular once PRN Glucose LESS THAN 70 milligrams/deciliter  levalbuterol Inhalation 1.25 milliGRAM(s) Inhalation two times a day PRN shortness of breath and/or wheezing  oxyCODONE    5 mG/acetaminophen 325 mG 1 Tablet(s) Oral every 6 hours PRN Severe Pain (7 - 10)  polyethylene glycol 3350 17 Gram(s) Oral two times a day PRN Constipation      Vital Signs Last 24 Hrs  T(C): 36.9 (03 Dec 2017 05:00), Max: 36.9 (02 Dec 2017 21:39)  T(F): 98.5 (03 Dec 2017 05:00), Max: 98.5 (03 Dec 2017 05:00)  HR: 90 (03 Dec 2017 05:00) (78 - 90)  BP: 143/74 (03 Dec 2017 05:00) (132/78 - 151/77)  BP(mean): --  RR: 16 (03 Dec 2017 05:00) (16 - 18)  SpO2: 98% (03 Dec 2017 05:00) (97% - 98%)  CAPILLARY BLOOD GLUCOSE      POCT Blood Glucose.: 212 mg/dL (03 Dec 2017 08:35)  POCT Blood Glucose.: 275 mg/dL (02 Dec 2017 21:45)  POCT Blood Glucose.: 268 mg/dL (02 Dec 2017 17:39)  POCT Blood Glucose.: 242 mg/dL (02 Dec 2017 12:12)    I&O's Summary    02 Dec 2017 07:01  -  03 Dec 2017 07:00  --------------------------------------------------------  IN: 1540 mL / OUT: 1 mL / NET: 1539 mL      PHYSICAL EXAM  GENERAL: Mild distress, clenching fists, well-developed  HEAD:  L cheek swelling, no erythema, but tender to touch   NECK: Supple, No JVD  CHEST/LUNG: Clear to auscultation bilaterally on anterior exam; No wheeze  HEART: Regular rate and rhythm; No murmurs, rubs, or gallops  ABDOMEN: Soft, Nontender, Nondistended; Bowel sounds present  EXTREMITIES:  2+ Peripheral Pulses, No clubbing, cyanosis, or edema  PSYCH: AAOx0      LABS:                        8.3    21.63 )-----------( 185      ( 02 Dec 2017 06:50 )             25.8     12-03    159<H>  |  129<H>  |  121<H>  ----------------------------<  226<H>  3.9   |  12<L>  |  2.68<H>    Ca    9.3      03 Dec 2017 06:04  Phos  5.5     12-03  Mg     2.5     12-03    Culture - Blood (12.02.17 @ 01:09)    Culture - Blood:   NO ORGANISMS ISOLATED  NO ORGANISMS ISOLATED AT 24 HOURS    Specimen Source: BLOOD    Culture - Blood (11.28.17 @ 16:37)    Culture - Blood:   SEE PREVIOUS CULTURE: [id number] COLLECTED 11/28/17 1606  RECEIVED 11/28/17 1637 FOR ZEESHAN  NO ORGANISMS ISOLATED AT 24 HOURS  MRSA^Staph. aureus *MRSA*  OXICILLIN-RESISTANT staphylococci should be regarded as  RESISTANT to ALL other Beta-Lactams regardless of the  in-vitro results obtained.  These include: ALL  Penicillins, Cephalosporins, Amoxicillin-clavulanic  acid, Ticarcillin-clavulanic acid,  Ampicillin-sulbactam, and Imipenem.    Specimen Source: BLOOD VENOUS    Gram Stain Blood:   ***** CRITICAL RESULT *****  PERSON CALLED / READ-BACK: WINSOME NOGUEIRA  DATE / TIME CALLED: 11/30/17 0704  CALLED BY: MOHAN JARRELL  GPCCL^Gram Pos Cocci In Clusters  AFTER: 37 HOURS INCUBATION  BOTTLE: AEROBIC   ANAEROBIC BOTTLES          RADIOLOGY & ADDITIONAL TESTS:    Imaging Personally Reviewed:  < from: CT Neck Soft Tissue No Cont (11.28.17 @ 20:19) >  IMPRESSION:    Extensive haziness along the soft tissues and fat planes of the left   face, left oropharynx and circumferentially along theremainder of the   neck. There is fullness along the left oropharynx with haziness along the   left paratracheal fat which may indicate a potential site of infection   although evaluation is extremely limited due to the lack of intravenous   contrast. Correlation with direct laryngoscopy is recommended.   Alternatively, infection may be centered along the left   parotid/submandibular region as there is asymmetric soft tissue swelling   in this area as well. The epiglottis is slightly thickened and clinical   evaluation of the airway is recommended. There is a thin retropharyngeal   effusion.      < end of copied text >    Consultant(s) Notes Reviewed:  ENT

## 2017-12-03 NOTE — PROGRESS NOTE ADULT - PROBLEM SELECTOR PLAN 1
- Spoke with ENT team, mainstay of treatment at this point is abx and parotid gland massage (3-4x daily), will ask nursing to assist with this  - Pt no longer on steroids (weaned off)   - Warm compresses to area   - Pain control PRN  - No surgical intervention planned at this time

## 2017-12-03 NOTE — PROGRESS NOTE ADULT - PROBLEM SELECTOR PLAN 2
- Bcx cleared since 12/1  - ID following, vanco dosing based on trough levels.  Trough level from last night 18, dosed vanco 750mg IV x1.  Random level this AM is 24.  Will have repeat level checked in AM and dose based on levels.   - TTE pending to r/o endocarditis.

## 2017-12-03 NOTE — PROGRESS NOTE ADULT - ASSESSMENT
89F hx CKD stage 4, advanced dementia (non verbal and bed bound), and recurrent UTIs presents with L cheek swelling consistent with parotitis.  Course complicated by MRSA bacteremia on IV vancomycin.

## 2017-12-03 NOTE — PROGRESS NOTE ADULT - ASSESSMENT
89 year old female with CKD, dementia presents with fever and facial swelling.    Exam and imaging consistent with parotitis/ siloadenitis as focus of MRSA bacteremia    Check a vanco level at 7 pm , if less than 15 - start vancomycin 750 mg iv daily.   If higher than 15, repeat vanco level in the am.    Patient's with Staph aurues bacteremia should have reat blood cultures at 40 hours- rpeat cultures are now testing.    Check TTE, if culture do not clear may need BARON.    If WBC does not improve, may need to repeat ct neck in a few days.    Overall prognosis is grave due to her underlying comorbidities.

## 2017-12-04 ENCOUNTER — CLINICAL ADVICE (OUTPATIENT)
Age: 82
End: 2017-12-04

## 2017-12-04 ENCOUNTER — APPOINTMENT (OUTPATIENT)
Dept: HOME HEALTH SERVICES | Facility: HOME HEALTH | Age: 82
End: 2017-12-04

## 2017-12-04 DIAGNOSIS — K59.00 CONSTIPATION, UNSPECIFIED: ICD-10-CM

## 2017-12-04 DIAGNOSIS — N18.4 CHRONIC KIDNEY DISEASE, STAGE 4 (SEVERE): ICD-10-CM

## 2017-12-04 DIAGNOSIS — R53.2 FUNCTIONAL QUADRIPLEGIA: ICD-10-CM

## 2017-12-04 DIAGNOSIS — Z74.01 BED CONFINEMENT STATUS: ICD-10-CM

## 2017-12-04 LAB
ANISOCYTOSIS BLD QL: SLIGHT — SIGNIFICANT CHANGE UP
BASOPHILS # BLD AUTO: 0.02 K/UL — SIGNIFICANT CHANGE UP (ref 0–0.2)
BASOPHILS NFR BLD AUTO: 0.1 % — SIGNIFICANT CHANGE UP (ref 0–2)
BASOPHILS NFR SPEC: 0 % — SIGNIFICANT CHANGE UP (ref 0–2)
BUN SERPL-MCNC: 91 MG/DL — HIGH (ref 7–23)
BUN SERPL-MCNC: 96 MG/DL — HIGH (ref 7–23)
CALCIUM SERPL-MCNC: 9.1 MG/DL — SIGNIFICANT CHANGE UP (ref 8.4–10.5)
CALCIUM SERPL-MCNC: 9.1 MG/DL — SIGNIFICANT CHANGE UP (ref 8.4–10.5)
CHLORIDE SERPL-SCNC: 129 MMOL/L — HIGH (ref 98–107)
CHLORIDE SERPL-SCNC: 135 MMOL/L — HIGH (ref 98–107)
CO2 SERPL-SCNC: 15 MMOL/L — LOW (ref 22–31)
CO2 SERPL-SCNC: 16 MMOL/L — LOW (ref 22–31)
CREAT SERPL-MCNC: 1.99 MG/DL — HIGH (ref 0.5–1.3)
CREAT SERPL-MCNC: 2.12 MG/DL — HIGH (ref 0.5–1.3)
EOSINOPHIL # BLD AUTO: 0.04 K/UL — SIGNIFICANT CHANGE UP (ref 0–0.5)
EOSINOPHIL NFR BLD AUTO: 0.3 % — SIGNIFICANT CHANGE UP (ref 0–6)
EOSINOPHIL NFR FLD: 0 % — SIGNIFICANT CHANGE UP (ref 0–6)
GIANT PLATELETS BLD QL SMEAR: PRESENT — SIGNIFICANT CHANGE UP
GLUCOSE SERPL-MCNC: 228 MG/DL — HIGH (ref 70–99)
GLUCOSE SERPL-MCNC: 259 MG/DL — HIGH (ref 70–99)
HCT VFR BLD CALC: 27 % — LOW (ref 34.5–45)
HGB BLD-MCNC: 8.7 G/DL — LOW (ref 11.5–15.5)
IMM GRANULOCYTES # BLD AUTO: 0.31 # — SIGNIFICANT CHANGE UP
IMM GRANULOCYTES NFR BLD AUTO: 2.2 % — HIGH (ref 0–1.5)
LYMPHOCYTES # BLD AUTO: 1.18 K/UL — SIGNIFICANT CHANGE UP (ref 1–3.3)
LYMPHOCYTES # BLD AUTO: 8.5 % — LOW (ref 13–44)
LYMPHOCYTES NFR SPEC AUTO: 8.3 % — LOW (ref 13–44)
MACROCYTES BLD QL: SLIGHT — SIGNIFICANT CHANGE UP
MAGNESIUM SERPL-MCNC: 2.4 MG/DL — SIGNIFICANT CHANGE UP (ref 1.6–2.6)
MCHC RBC-ENTMCNC: 31.6 PG — SIGNIFICANT CHANGE UP (ref 27–34)
MCHC RBC-ENTMCNC: 32.2 % — SIGNIFICANT CHANGE UP (ref 32–36)
MCV RBC AUTO: 98.2 FL — SIGNIFICANT CHANGE UP (ref 80–100)
MONOCYTES # BLD AUTO: 1.22 K/UL — HIGH (ref 0–0.9)
MONOCYTES NFR BLD AUTO: 8.7 % — SIGNIFICANT CHANGE UP (ref 2–14)
MONOCYTES NFR BLD: 11.9 % — HIGH (ref 2–9)
NEUTROPHIL AB SER-ACNC: 78 % — HIGH (ref 43–77)
NEUTROPHILS # BLD AUTO: 11.19 K/UL — HIGH (ref 1.8–7.4)
NEUTROPHILS NFR BLD AUTO: 80.2 % — HIGH (ref 43–77)
NEUTS BAND # BLD: 0.9 % — SIGNIFICANT CHANGE UP (ref 0–6)
NRBC # FLD: 0 — SIGNIFICANT CHANGE UP
OVALOCYTES BLD QL SMEAR: SIGNIFICANT CHANGE UP
PHOSPHATE SERPL-MCNC: 4.6 MG/DL — HIGH (ref 2.5–4.5)
PLATELET # BLD AUTO: 172 K/UL — SIGNIFICANT CHANGE UP (ref 150–400)
PLATELET COUNT - ESTIMATE: NORMAL — SIGNIFICANT CHANGE UP
PMV BLD: 14 FL — HIGH (ref 7–13)
POIKILOCYTOSIS BLD QL AUTO: SIGNIFICANT CHANGE UP
POLYCHROMASIA BLD QL SMEAR: SLIGHT — SIGNIFICANT CHANGE UP
POTASSIUM SERPL-MCNC: 3.8 MMOL/L — SIGNIFICANT CHANGE UP (ref 3.5–5.3)
POTASSIUM SERPL-MCNC: 4 MMOL/L — SIGNIFICANT CHANGE UP (ref 3.5–5.3)
POTASSIUM SERPL-SCNC: 3.8 MMOL/L — SIGNIFICANT CHANGE UP (ref 3.5–5.3)
POTASSIUM SERPL-SCNC: 4 MMOL/L — SIGNIFICANT CHANGE UP (ref 3.5–5.3)
RBC # BLD: 2.75 M/UL — LOW (ref 3.8–5.2)
RBC # FLD: 14.4 % — SIGNIFICANT CHANGE UP (ref 10.3–14.5)
SCHISTOCYTES BLD QL AUTO: SIGNIFICANT CHANGE UP
SODIUM SERPL-SCNC: 161 MMOL/L — CRITICAL HIGH (ref 135–145)
SODIUM SERPL-SCNC: 166 MMOL/L — CRITICAL HIGH (ref 135–145)
VANCOMYCIN FLD-MCNC: 20.3 UG/ML — SIGNIFICANT CHANGE UP
VARIANT LYMPHS # BLD: 0.9 % — SIGNIFICANT CHANGE UP
WBC # BLD: 13.96 K/UL — HIGH (ref 3.8–10.5)
WBC # FLD AUTO: 13.96 K/UL — HIGH (ref 3.8–10.5)

## 2017-12-04 PROCEDURE — 99233 SBSQ HOSP IP/OBS HIGH 50: CPT

## 2017-12-04 PROCEDURE — 99232 SBSQ HOSP IP/OBS MODERATE 35: CPT

## 2017-12-04 RX ORDER — INSULIN LISPRO 100/ML
VIAL (ML) SUBCUTANEOUS AT BEDTIME
Qty: 0 | Refills: 0 | Status: DISCONTINUED | OUTPATIENT
Start: 2017-12-04 | End: 2017-12-09

## 2017-12-04 RX ORDER — LACTOBACILLUS ACIDOPHILUS 100MM CELL
1 CAPSULE ORAL
Qty: 0 | Refills: 0 | Status: DISCONTINUED | OUTPATIENT
Start: 2017-12-04 | End: 2017-12-09

## 2017-12-04 RX ORDER — SODIUM CHLORIDE 9 MG/ML
1000 INJECTION, SOLUTION INTRAVENOUS
Qty: 0 | Refills: 0 | Status: DISCONTINUED | OUTPATIENT
Start: 2017-12-04 | End: 2017-12-06

## 2017-12-04 RX ORDER — LACTULOSE 10 G/15ML
10 SOLUTION ORAL ONCE
Qty: 0 | Refills: 0 | Status: COMPLETED | OUTPATIENT
Start: 2017-12-04 | End: 2017-12-04

## 2017-12-04 RX ORDER — VANCOMYCIN HCL 1 G
750 VIAL (EA) INTRAVENOUS
Qty: 0 | Refills: 0 | Status: DISCONTINUED | OUTPATIENT
Start: 2017-12-04 | End: 2017-12-05

## 2017-12-04 RX ADMIN — Medication 150 MILLIGRAM(S): at 18:06

## 2017-12-04 RX ADMIN — SENNA PLUS 2 TABLET(S): 8.6 TABLET ORAL at 22:40

## 2017-12-04 RX ADMIN — POLYETHYLENE GLYCOL 3350 17 GRAM(S): 17 POWDER, FOR SOLUTION ORAL at 16:19

## 2017-12-04 RX ADMIN — Medication 2: at 22:40

## 2017-12-04 RX ADMIN — POLYETHYLENE GLYCOL 3350 17 GRAM(S): 17 POWDER, FOR SOLUTION ORAL at 17:56

## 2017-12-04 RX ADMIN — Medication 1 TABLET(S): at 17:57

## 2017-12-04 RX ADMIN — INSULIN GLARGINE 5 UNIT(S): 100 INJECTION, SOLUTION SUBCUTANEOUS at 22:39

## 2017-12-04 RX ADMIN — HEPARIN SODIUM 5000 UNIT(S): 5000 INJECTION INTRAVENOUS; SUBCUTANEOUS at 16:15

## 2017-12-04 RX ADMIN — HEPARIN SODIUM 5000 UNIT(S): 5000 INJECTION INTRAVENOUS; SUBCUTANEOUS at 06:39

## 2017-12-04 RX ADMIN — Medication 37 MICROGRAM(S): at 06:40

## 2017-12-04 RX ADMIN — HEPARIN SODIUM 5000 UNIT(S): 5000 INJECTION INTRAVENOUS; SUBCUTANEOUS at 22:40

## 2017-12-04 RX ADMIN — Medication 2: at 12:33

## 2017-12-04 RX ADMIN — Medication 2: at 17:57

## 2017-12-04 RX ADMIN — LACTULOSE 10 GRAM(S): 10 SOLUTION ORAL at 17:56

## 2017-12-04 RX ADMIN — SODIUM CHLORIDE 100 MILLILITER(S): 9 INJECTION, SOLUTION INTRAVENOUS at 12:35

## 2017-12-04 RX ADMIN — Medication 650 MILLIGRAM(S): at 18:07

## 2017-12-04 NOTE — PROGRESS NOTE ADULT - PROBLEM SELECTOR PLAN 2
- Bcx cleared since 12/1  - ID following, vanco dosing based on trough levels.    - Random level remains elevated. Hold Vanc today and check repeat level in    - TTE pending to r/o endocarditis. - Bcx cleared since 12/1  - ID following, vanco dosing based on trough levels.    - Random level remains elevated. Hold Vanc today and check repeat level in    - TTE pending to r/o endocarditis.  - Plan for PICC line

## 2017-12-04 NOTE — PROGRESS NOTE ADULT - ASSESSMENT
89 year old female with CKD, dementia presents with fever and facial swelling.    Exam and imaging consistent with parotitis/ siloadenitis as focus of MRSA bacteremia    Repeat blood cultures are without growth.    Check TTE- risk of BARON likely outweight benefits     Start Vancmycin 750 mg iv q 48    Check vacno through before fourth dose    Weekly CBC, BMP, vanco through    Overall prognosis poor due to her comorbidities.    No ID objection to picc    Call with questions

## 2017-12-04 NOTE — PROGRESS NOTE ADULT - PROBLEM SELECTOR PLAN 1
- ENT recs appreciated. Continue IV abx as per ID and parotid gland massage (3-4x daily), will ask nursing to assist with this  - Pt no longer on steroids (weaned off)   - Warm compresses to area   - Pain control PRN  - No surgical intervention planned at this time

## 2017-12-04 NOTE — PROGRESS NOTE ADULT - SUBJECTIVE AND OBJECTIVE BOX
Follow Up:      Inverval History/ROS:Patient is a 89y old  Female who presents with a chief complaint of UTI and face swelling (01 Dec 2017 07:19)    No fever.  No events.        Allergies    No Known Allergies    Intolerances        ANTIMICROBIALS:      OTHER MEDS:  acetaminophen   Tablet. 325 milliGRAM(s) Oral every 6 hours PRN  acetaminophen   Tablet. 650 milliGRAM(s) Oral every 6 hours PRN  bisacodyl Suppository 10 milliGRAM(s) Rectal daily PRN  dextrose 5%. 1000 milliLiter(s) IV Continuous <Continuous>  dextrose 5%. 1000 milliLiter(s) IV Continuous <Continuous>  dextrose 50% Injectable 12.5 Gram(s) IV Push once  dextrose 50% Injectable 12.5 Gram(s) IV Push once  dextrose 50% Injectable 25 Gram(s) IV Push once  dextrose 50% Injectable 25 Gram(s) IV Push once  dextrose Gel 1 Dose(s) Oral once PRN  docusate sodium 100 milliGRAM(s) Oral three times a day  glucagon  Injectable 1 milliGRAM(s) IntraMuscular once PRN  heparin  Injectable 5000 Unit(s) SubCutaneous every 8 hours  influenza   Vaccine 0.5 milliLiter(s) IntraMuscular once  insulin glargine Injectable (LANTUS) 5 Unit(s) SubCutaneous at bedtime  insulin lispro (HumaLOG) corrective regimen sliding scale   SubCutaneous three times a day before meals  levalbuterol Inhalation 1.25 milliGRAM(s) Inhalation two times a day PRN  levothyroxine Injectable 37 MICROGram(s) IV Push daily  oxyCODONE    5 mG/acetaminophen 325 mG 1 Tablet(s) Oral every 6 hours PRN  polyethylene glycol 3350 17 Gram(s) Oral two times a day PRN  senna 2 Tablet(s) Oral at bedtime  sodium bicarbonate 650 milliGRAM(s) Oral two times a day      Vital Signs Last 24 Hrs  T(C): 37 (04 Dec 2017 10:05), Max: 37.1 (03 Dec 2017 22:17)  T(F): 98.6 (04 Dec 2017 10:05), Max: 98.8 (03 Dec 2017 22:17)  HR: 84 (04 Dec 2017 10:05) (84 - 97)  BP: 136/55 (04 Dec 2017 10:05) (123/68 - 142/67)  BP(mean): --  RR: 17 (04 Dec 2017 10:05) (17 - 18)  SpO2: 96% (04 Dec 2017 10:05) (96% - 100%)    PHYSICAL EXAM:  General: [ x] non-toxic  HEAD/EYES: [ ] PERRL [ x] white sclera [ ] icterus  ENT:  [ ] normal [ ] supple [ ] thrush [ x] left face is still swollen/ indurated- but improved  Cardiovascular:   [ ] murmur [ x] normal [ ] PPM/AICD  Respiratory:  [ x] clear to ausculation bilaterally  GI:  [ x] soft, non-tender, normal bowel sounds  :  [ ] ewing [x ] no CVA tenderness   Musculoskeletal:  [ x] no synovitis  Neurologic:  [x ] non-focal exam   Skin:  [x ] no rash  Lymph: [ x] no lymphadenopathy  Psychiatric:  [ x] appropriate affect [ ] alert & oriented  Lines:  [x ] no phlebitis [ ] central line                                8.7    13.96 )-----------( 172      ( 04 Dec 2017 06:45 )             27.0       12-04    166<HH>  |  135<H>  |  96<H>  ----------------------------<  228<H>  3.8   |  16<L>  |  2.12<H>    Ca    9.1      04 Dec 2017 06:45  Phos  4.6     12-04  Mg     2.4     12-04            MICROBIOLOGY:    RADIOLOGY:

## 2017-12-05 DIAGNOSIS — Z51.5 ENCOUNTER FOR PALLIATIVE CARE: ICD-10-CM

## 2017-12-05 DIAGNOSIS — F03.90 UNSPECIFIED DEMENTIA WITHOUT BEHAVIORAL DISTURBANCE: ICD-10-CM

## 2017-12-05 LAB
BASOPHILS # BLD AUTO: 0.02 K/UL — SIGNIFICANT CHANGE UP (ref 0–0.2)
BASOPHILS NFR BLD AUTO: 0.1 % — SIGNIFICANT CHANGE UP (ref 0–2)
BUN SERPL-MCNC: 84 MG/DL — HIGH (ref 7–23)
BUN SERPL-MCNC: 85 MG/DL — HIGH (ref 7–23)
CALCIUM SERPL-MCNC: 8.6 MG/DL — SIGNIFICANT CHANGE UP (ref 8.4–10.5)
CALCIUM SERPL-MCNC: 9.1 MG/DL — SIGNIFICANT CHANGE UP (ref 8.4–10.5)
CHLORIDE SERPL-SCNC: 124 MMOL/L — HIGH (ref 98–107)
CHLORIDE SERPL-SCNC: 127 MMOL/L — HIGH (ref 98–107)
CO2 SERPL-SCNC: 13 MMOL/L — LOW (ref 22–31)
CO2 SERPL-SCNC: 17 MMOL/L — LOW (ref 22–31)
CREAT SERPL-MCNC: 1.93 MG/DL — HIGH (ref 0.5–1.3)
CREAT SERPL-MCNC: 2.04 MG/DL — HIGH (ref 0.5–1.3)
EOSINOPHIL # BLD AUTO: 0.21 K/UL — SIGNIFICANT CHANGE UP (ref 0–0.5)
EOSINOPHIL NFR BLD AUTO: 1.3 % — SIGNIFICANT CHANGE UP (ref 0–6)
GLUCOSE SERPL-MCNC: 236 MG/DL — HIGH (ref 70–99)
GLUCOSE SERPL-MCNC: 286 MG/DL — HIGH (ref 70–99)
HCT VFR BLD CALC: 26.8 % — LOW (ref 34.5–45)
HGB BLD-MCNC: 8.7 G/DL — LOW (ref 11.5–15.5)
IMM GRANULOCYTES # BLD AUTO: 0.25 # — SIGNIFICANT CHANGE UP
IMM GRANULOCYTES NFR BLD AUTO: 1.6 % — HIGH (ref 0–1.5)
LYMPHOCYTES # BLD AUTO: 1.28 K/UL — SIGNIFICANT CHANGE UP (ref 1–3.3)
LYMPHOCYTES # BLD AUTO: 8.2 % — LOW (ref 13–44)
MAGNESIUM SERPL-MCNC: 2.4 MG/DL — SIGNIFICANT CHANGE UP (ref 1.6–2.6)
MCHC RBC-ENTMCNC: 31.9 PG — SIGNIFICANT CHANGE UP (ref 27–34)
MCHC RBC-ENTMCNC: 32.5 % — SIGNIFICANT CHANGE UP (ref 32–36)
MCV RBC AUTO: 98.2 FL — SIGNIFICANT CHANGE UP (ref 80–100)
MONOCYTES # BLD AUTO: 0.98 K/UL — HIGH (ref 0–0.9)
MONOCYTES NFR BLD AUTO: 6.3 % — SIGNIFICANT CHANGE UP (ref 2–14)
NEUTROPHILS # BLD AUTO: 12.91 K/UL — HIGH (ref 1.8–7.4)
NEUTROPHILS NFR BLD AUTO: 82.5 % — HIGH (ref 43–77)
NRBC # FLD: 0 — SIGNIFICANT CHANGE UP
PHOSPHATE SERPL-MCNC: 4.4 MG/DL — SIGNIFICANT CHANGE UP (ref 2.5–4.5)
PLATELET # BLD AUTO: 157 K/UL — SIGNIFICANT CHANGE UP (ref 150–400)
PMV BLD: 13.6 FL — HIGH (ref 7–13)
POTASSIUM SERPL-MCNC: 3.9 MMOL/L — SIGNIFICANT CHANGE UP (ref 3.5–5.3)
POTASSIUM SERPL-MCNC: 4 MMOL/L — SIGNIFICANT CHANGE UP (ref 3.5–5.3)
POTASSIUM SERPL-SCNC: 3.9 MMOL/L — SIGNIFICANT CHANGE UP (ref 3.5–5.3)
POTASSIUM SERPL-SCNC: 4 MMOL/L — SIGNIFICANT CHANGE UP (ref 3.5–5.3)
RBC # BLD: 2.73 M/UL — LOW (ref 3.8–5.2)
RBC # FLD: 14.7 % — HIGH (ref 10.3–14.5)
SODIUM SERPL-SCNC: 154 MMOL/L — HIGH (ref 135–145)
SODIUM SERPL-SCNC: 159 MMOL/L — HIGH (ref 135–145)
VANCOMYCIN FLD-MCNC: 26.7 UG/ML — CRITICAL HIGH
WBC # BLD: 15.65 K/UL — HIGH (ref 3.8–10.5)
WBC # FLD AUTO: 15.65 K/UL — HIGH (ref 3.8–10.5)

## 2017-12-05 PROCEDURE — 99232 SBSQ HOSP IP/OBS MODERATE 35: CPT

## 2017-12-05 PROCEDURE — 99223 1ST HOSP IP/OBS HIGH 75: CPT

## 2017-12-05 PROCEDURE — 99233 SBSQ HOSP IP/OBS HIGH 50: CPT

## 2017-12-05 RX ORDER — VANCOMYCIN HCL 1 G
750 VIAL (EA) INTRAVENOUS
Qty: 0 | Refills: 0 | Status: DISCONTINUED | OUTPATIENT
Start: 2017-12-06 | End: 2017-12-06

## 2017-12-05 RX ORDER — INSULIN GLARGINE 100 [IU]/ML
8 INJECTION, SOLUTION SUBCUTANEOUS AT BEDTIME
Qty: 0 | Refills: 0 | Status: DISCONTINUED | OUTPATIENT
Start: 2017-12-05 | End: 2017-12-07

## 2017-12-05 RX ORDER — HYDROMORPHONE HYDROCHLORIDE 2 MG/ML
0.2 INJECTION INTRAMUSCULAR; INTRAVENOUS; SUBCUTANEOUS
Qty: 0 | Refills: 0 | Status: DISCONTINUED | OUTPATIENT
Start: 2017-12-05 | End: 2017-12-07

## 2017-12-05 RX ADMIN — Medication 2: at 08:55

## 2017-12-05 RX ADMIN — HEPARIN SODIUM 5000 UNIT(S): 5000 INJECTION INTRAVENOUS; SUBCUTANEOUS at 13:30

## 2017-12-05 RX ADMIN — Medication 1 TABLET(S): at 09:36

## 2017-12-05 RX ADMIN — Medication 1: at 22:16

## 2017-12-05 RX ADMIN — HEPARIN SODIUM 5000 UNIT(S): 5000 INJECTION INTRAVENOUS; SUBCUTANEOUS at 07:07

## 2017-12-05 RX ADMIN — Medication 4: at 13:29

## 2017-12-05 RX ADMIN — SODIUM CHLORIDE 100 MILLILITER(S): 9 INJECTION, SOLUTION INTRAVENOUS at 08:57

## 2017-12-05 RX ADMIN — INSULIN GLARGINE 8 UNIT(S): 100 INJECTION, SOLUTION SUBCUTANEOUS at 22:16

## 2017-12-05 RX ADMIN — Medication 650 MILLIGRAM(S): at 07:07

## 2017-12-05 RX ADMIN — Medication 3: at 18:16

## 2017-12-05 RX ADMIN — HEPARIN SODIUM 5000 UNIT(S): 5000 INJECTION INTRAVENOUS; SUBCUTANEOUS at 22:15

## 2017-12-05 RX ADMIN — Medication 37 MICROGRAM(S): at 07:07

## 2017-12-05 NOTE — CONSULT NOTE ADULT - ATTENDING COMMENTS
Pt seen and examined  Daughter Ros present giving collateral information  PTs daughter has some reservations regarding our involvement  Challenges in rapport building  Described our role in her mother's care  Multiple requests for us to read the EMR when  confirming information  HHA of 9 years present

## 2017-12-05 NOTE — CONSULT NOTE ADULT - ASSESSMENT
Patient is a 89yo female with history of endstage dementia, CKD, hypothyroidism, breast cancer , resides with daughter, Caron, is the hcp, (as per the molst form), who was admitted for decreased responsiveness, fever,  left sided facial swelling with parotitis.  She has being treated with IV Vancomycin for MRSA bacteremia.  Patient had speech and swallow evaluate her, she will be going for barium swallow today.  As per daughter, at home, patient has been taking Ativan and Tramadol daily.  Daughter is in agreement to continue care measures to keep patient comfortable.

## 2017-12-05 NOTE — PROGRESS NOTE ADULT - PROBLEM SELECTOR PLAN 2
- Bcx cleared since 12/1  - ID following, vanco dosing based on trough levels.    - Vanc 750mg q48hr. Monitor trough  - TTE pending to r/o endocarditis.  - Plan for PICC line

## 2017-12-05 NOTE — SWALLOW BEDSIDE ASSESSMENT ADULT - COMMENTS
Patient's daughter and home health aide present at time.  Home health aide was feeding the patient prior to this assessment.  Patient received with wet vocal quality.  Daughter reports that she noted this vocal quality this morning however was not noted last night.

## 2017-12-05 NOTE — PROGRESS NOTE ADULT - ASSESSMENT
89 year old female with CKD, dementia presents with fever and facial swelling.    Exam and imaging consistent with parotitis/ siloadenitis as focus of MRSA bacteremia    Repeat blood cultures are without growth.    Check TTE- risk of BARON likely outweight benefits     Start Vancmycin 750 mg iv q 48    Check vacno through before fourth dose  Vanco level was not a through.       Weekly CBC, BMP, vanco through    No ID objection to picc    Leukocytosis is likely multifactorial. If remains high- consider repeat ct neck    Overall prognosis poor due to her comorbidities.

## 2017-12-05 NOTE — CONSULT NOTE ADULT - PROBLEM SELECTOR RECOMMENDATION 4
Start Ativan 0.25mg IV Q6h, PRN for anxiety  Start Dilaudid 0.2mg IV Q3h, PRN for pain management   Discontinue Oxycodone as patient is having dysphagia  Wound care to sacral decubitus

## 2017-12-05 NOTE — SWALLOW BEDSIDE ASSESSMENT ADULT - SWALLOW EVAL: RECOMMENDED DIET
PO feeding judged to be contraindicated at this time; MBS is recommended to objectively assess function

## 2017-12-05 NOTE — PROGRESS NOTE ADULT - PROBLEM SELECTOR PLAN 1
- ENT recs appreciated. Continue IV abx as per ID and parotid gland massage (3-4x daily), family and home aide continues to do this  - Pt no longer on steroids (weaned off)   - Warm compresses to area   - Pain control PRN  - No surgical intervention planned at this time

## 2017-12-05 NOTE — SWALLOW BEDSIDE ASSESSMENT ADULT - SWALLOW EVAL: DIAGNOSIS
1.  Moderate-severe oral dysphagia given puree and honey thick fluids marked by reduced labial of the utensil and reduced and latent A-P transfer of the bolus.  2.  Severe pharyngeal dysphagia given puree and honey thick fluids marked by significant delay in the trigger of the swallow and reduced laryngeal excursion. Patient with wet vocal quality following the swallow suggestive of laryngeal penetration and/or aspiration

## 2017-12-05 NOTE — PROGRESS NOTE ADULT - SUBJECTIVE AND OBJECTIVE BOX
Patient is a 89y old  Female who presents with a chief complaint of UTI and face swelling (01 Dec 2017 07:19)      SUBJECTIVE / OVERNIGHT EVENTS: No overnight events. Pt non-verbal unable to provide ROS.     BM noted yesterday.     MEDICATIONS  (STANDING):  dextrose 5%. 1000 milliLiter(s) (100 mL/Hr) IV Continuous <Continuous>  dextrose 5%. 1000 milliLiter(s) (50 mL/Hr) IV Continuous <Continuous>  dextrose 50% Injectable 12.5 Gram(s) IV Push once  dextrose 50% Injectable 12.5 Gram(s) IV Push once  dextrose 50% Injectable 25 Gram(s) IV Push once  dextrose 50% Injectable 25 Gram(s) IV Push once  docusate sodium 100 milliGRAM(s) Oral three times a day  heparin  Injectable 5000 Unit(s) SubCutaneous every 8 hours  influenza   Vaccine 0.5 milliLiter(s) IntraMuscular once  insulin glargine Injectable (LANTUS) 8 Unit(s) SubCutaneous at bedtime  insulin lispro (HumaLOG) corrective regimen sliding scale   SubCutaneous at bedtime  insulin lispro (HumaLOG) corrective regimen sliding scale   SubCutaneous three times a day before meals  lactobacillus acidophilus 1 Tablet(s) Oral two times a day with meals  levothyroxine Injectable 37 MICROGram(s) IV Push daily  senna 2 Tablet(s) Oral at bedtime  sodium bicarbonate 650 milliGRAM(s) Oral two times a day    MEDICATIONS  (PRN):  acetaminophen   Tablet. 325 milliGRAM(s) Oral every 6 hours PRN Mild Pain (1 - 3)  acetaminophen   Tablet. 650 milliGRAM(s) Oral every 6 hours PRN Moderate Pain (4 - 6)  bisacodyl Suppository 10 milliGRAM(s) Rectal daily PRN Constipation  dextrose Gel 1 Dose(s) Oral once PRN Blood Glucose LESS THAN 70 milliGRAM(s)/deciliter  glucagon  Injectable 1 milliGRAM(s) IntraMuscular once PRN Glucose LESS THAN 70 milligrams/deciliter  HYDROmorphone  Injectable 0.2 milliGRAM(s) IV Push every 3 hours PRN Mild to severe Pain  levalbuterol Inhalation 1.25 milliGRAM(s) Inhalation two times a day PRN shortness of breath and/or wheezing  LORazepam   Injectable 0.25 milliGRAM(s) IV Push every 6 hours PRN Anxiety  polyethylene glycol 3350 17 Gram(s) Oral two times a day PRN Constipation        CAPILLARY BLOOD GLUCOSE      POCT Blood Glucose.: 302 mg/dL (05 Dec 2017 11:59)  POCT Blood Glucose.: 234 mg/dL (05 Dec 2017 08:15)  POCT Blood Glucose.: 326 mg/dL (04 Dec 2017 21:51)  POCT Blood Glucose.: 248 mg/dL (04 Dec 2017 17:23)    I&O's Summary    04 Dec 2017 07:01  -  05 Dec 2017 07:00  --------------------------------------------------------  IN: 830 mL / OUT: 0 mL / NET: 830 mL    05 Dec 2017 07:01  -  05 Dec 2017 14:32  --------------------------------------------------------  IN: 800 mL / OUT: 0 mL / NET: 800 mL    Vital Signs Last 24 Hrs  T(C): 36.8 (05 Dec 2017 06:48), Max: 37 (05 Dec 2017 02:00)  T(F): 98.2 (05 Dec 2017 06:48), Max: 98.6 (05 Dec 2017 02:00)  HR: 80 (05 Dec 2017 06:48) (80 - 88)  BP: 130/55 (05 Dec 2017 06:48) (115/66 - 149/70)  BP(mean): --  RR: 17 (05 Dec 2017 06:48) (17 - 18)  SpO2: 100% (05 Dec 2017 06:48) (99% - 100%)    PHYSICAL EXAM:  GENERAL: No acute distress, moans and grunts intermittently which is baseline according to her daughter and aide at bedside  HEENT: L cheek remains swollen, firm to touch  NECK: Supple  CHEST/LUNG: Clear to auscultation bilaterally  HEART: S1/S2, Regular rate and rhythm, 2/6 late peaking SABINE loudest in RUSB  ABDOMEN: Distended, NABS, soft,  nontender  EXTREMITIES:  2+ Peripheral Pulses, no clubbing, cyanosis, or edema  NEUROLOGY: contracted, no strength in UE/ LE    LABS: reviewed                        8.7    15.65 )-----------( 157      ( 05 Dec 2017 07:30 )             26.8     12-05    159<H>  |  127<H>  |  84<H>  ----------------------------<  236<H>  3.9   |  17<L>  |  2.04<H>    Ca    9.1      05 Dec 2017 07:30  Phos  4.4     12-05  Mg     2.4     12-05      RADIOLOGY & ADDITIONAL TESTS:    Imaging Personally Reviewed:    Consultant(s) Notes Reviewed:  ID, palliative care    Care Discussed with Consultants/Other Providers: ID Re: vanc dosing

## 2017-12-05 NOTE — CONSULT NOTE ADULT - PROBLEM SELECTOR RECOMMENDATION 2
Continue to monitor for worsening respiratory depression
Continue with fluids, continue to trend  Patient overall downtrending   May have established new baseline  Not a dialysis candidate  Please check BMP with magnesium and phosphate  May need to start phosphate binder and bicarbonate

## 2017-12-05 NOTE — CONSULT NOTE ADULT - SUBJECTIVE AND OBJECTIVE BOX
HPI:  Patient is a 89yo female with history of endstage dementia, CKD, hypothyroidism, breast cancer , resides with daughter, Caron, is the hcp, (as per the molst form), who was admitted for decreased responsiveness, fever,  left sided facial swelling with parotitis.  She has being treated with IV Vancomycin for MRSA bacteremia.  Patient is observed to be sitting up in bed with her eyes closed, alert and oriented x O.  She makes intermittent high pitched sounds.  Patient received one prn dose of medicine on 12/3.  As per her daughter, patient has been non-verbal for the past three years and has HHA for the past nine years.   She tends to make sounds if she is wet, in pain and if the conversation around her is loud.   At times when she has anxiety, she becomes contracted.  At home, patient was taking Ativan daily, Tramadol daily and Tylenol.   Reference #: [id number], no record for patient.      PERTINENT PMH REVIEWED:  [x ] YES [ ] NO           SOCIAL HISTORY:  Significant other/partner:  [ ] YES  [ ] NO            Children:  [x ] YES  [ ] NO                   Synagogue/Spirituality:  Substance hx:  [ ] YES   [ ] NO           Tobacco hx:  [ ] YES  [ ] NO             Alcohol hx: [ ] YES  [ ] NO        Home Opioid hx:  [ ] YES  [ ] NO   Living Situation: [x ] Home  [ ] Long term care  [ ] Rehab    REFERRALS:   [ ] Chaplaincy  [ ] Hospice  [ ] Child Life  [ ] Social Work  [x ] Case management [ ] Holistic Therapy     FAMILY HISTORY:  No pertinent family history in first degree relatives    [ ] Family history non contributory     BASELINE ADLs (prior to admission):  Independent [ ] moderately [x ] fully   Dependent   [ ] moderately [x ] fully    ADVANCE DIRECTIVES:  [ ] YES [ ] NO   DNR [ ] YES [x ] NO                      MOLST  [x ] YES [ ] NO    Living Will  [ ] YES [ ] NO    Health Care Proxy [x ] YES  [ ] NO      [ ] Surrogate  [ x] HCP  [ ] Guardian:           Caron Esparza                 Phone#: [phone]    Allergies    No Known Allergies    Intolerances        MEDICATIONS  (STANDING):  dextrose 5%. 1000 milliLiter(s) (100 mL/Hr) IV Continuous <Continuous>  dextrose 5%. 1000 milliLiter(s) (50 mL/Hr) IV Continuous <Continuous>  dextrose 50% Injectable 12.5 Gram(s) IV Push once  dextrose 50% Injectable 12.5 Gram(s) IV Push once  dextrose 50% Injectable 25 Gram(s) IV Push once  dextrose 50% Injectable 25 Gram(s) IV Push once  docusate sodium 100 milliGRAM(s) Oral three times a day  heparin  Injectable 5000 Unit(s) SubCutaneous every 8 hours  influenza   Vaccine 0.5 milliLiter(s) IntraMuscular once  insulin glargine Injectable (LANTUS) 8 Unit(s) SubCutaneous at bedtime  insulin lispro (HumaLOG) corrective regimen sliding scale   SubCutaneous at bedtime  insulin lispro (HumaLOG) corrective regimen sliding scale   SubCutaneous three times a day before meals  lactobacillus acidophilus 1 Tablet(s) Oral two times a day with meals  levothyroxine Injectable 37 MICROGram(s) IV Push daily  senna 2 Tablet(s) Oral at bedtime  sodium bicarbonate 650 milliGRAM(s) Oral two times a day    MEDICATIONS  (PRN):  acetaminophen   Tablet. 325 milliGRAM(s) Oral every 6 hours PRN Mild Pain (1 - 3)  acetaminophen   Tablet. 650 milliGRAM(s) Oral every 6 hours PRN Moderate Pain (4 - 6)  bisacodyl Suppository 10 milliGRAM(s) Rectal daily PRN Constipation  dextrose Gel 1 Dose(s) Oral once PRN Blood Glucose LESS THAN 70 milliGRAM(s)/deciliter  glucagon  Injectable 1 milliGRAM(s) IntraMuscular once PRN Glucose LESS THAN 70 milligrams/deciliter  levalbuterol Inhalation 1.25 milliGRAM(s) Inhalation two times a day PRN shortness of breath and/or wheezing  oxyCODONE    5 mG/acetaminophen 325 mG 1 Tablet(s) Oral every 6 hours PRN Severe Pain (7 - 10)  polyethylene glycol 3350 17 Gram(s) Oral two times a day PRN Constipation      PRESENT SYMPTOMS:  Source: [ ] Patient   [x ] Family   [ ] Team     Pain: [ ] YES [ ] NO  OLDCARTS:     Dyspnea: [ ] YES [x ] NO   Anxiety: [x ] YES [ ] NO  Fatigue: [ ] YES [ x] NO   Nausea: [x ] YES [ ] NO  Loss of appetite: [ ] YES [x ] NO   Constipation: [x ] YES [ ] NO     Other Symptoms:  [ ] All other review of systems negative   [x ] Unable to obtain due to poor mentation     Karnofsky Performance Score/Palliative Performance Status Version 2:         %  Protein Calorie Malutrition:  [ ] Mild   [ ] Moderate   [ ] Severe     Vital Signs Last 24 Hrs  T(C): 36.8 (05 Dec 2017 06:48), Max: 37 (05 Dec 2017 02:00)  T(F): 98.2 (05 Dec 2017 06:48), Max: 98.6 (05 Dec 2017 02:00)  HR: 80 (05 Dec 2017 06:48) (80 - 88)  BP: 130/55 (05 Dec 2017 06:48) (115/66 - 149/70)  BP(mean): --  RR: 17 (05 Dec 2017 06:48) (17 - 18)  SpO2: 100% (05 Dec 2017 06:48) (99% - 100%)    Physical Exam:    General: [ ] Alert,  A&O x  O   [x ] lethargic   [ ] Agitated   [ ] Cachexia   HEENT: [ ] Normal   [ ] Dry mouth   [ ] ET Tube    [ ] Trach   Lungs:  normal breath sounds, [ ] Clear [ ] Rhonchi  [ ] Crackles [ ] Wheezing [ ] Tachypnea  [ ] Audible excessive secretions   Cardiovascular:  [x ] Regular rate and rhythm  [ ] Irregular [ ] Tachycardia   [ ] Bradycardia   Abdomen: [x ] Soft  [ ] Distended  [ ]  [ ] +BS  [ ] Non tender [ ] Tender  [ ]PEG   [ ] NGT   Last BM:     Genitourinary: [ ] Normal [ ] Incontinent   [ ] Oliguria/Anuria   [ ] Serna  Musculoskeletal:  [ ] Normal   [ ] Generalized weakness  [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 , quadreplegic   Neurological: [ ] No focal deficits  [ x] Cognitive impairment     Skin: [x ] Normal   [ ] Pressure ulcers     LABS:                        8.7    15.65 )-----------( 157      ( 05 Dec 2017 07:30 )             26.8     12-05    159<H>  |  127<H>  |  84<H>  ----------------------------<  236<H>  3.9   |  17<L>  |  2.04<H>    Ca    9.1      05 Dec 2017 07:30  Phos  4.4     12-05  Mg     2.4     12-05          I&O's Summary    04 Dec 2017 07:01  -  05 Dec 2017 07:00  --------------------------------------------------------  IN: 830 mL / OUT: 0 mL / NET: 830 mL        RADIOLOGY & ADDITIONAL STUDIES: HPI:  Patient is a 89yo female with history of endstage dementia, CKD, hypothyroidism, breast cancer , resides with daughter, Caron, is the hcp, (as per the molst form), who was admitted for decreased responsiveness, fever,  left sided facial swelling with parotitis.  She has being treated with IV Vancomycin for MRSA bacteremia.  Patient is observed to be sitting up in bed with her eyes closed, alert and oriented x O.  She makes intermittent high pitched sounds.  Patient received one prn dose of medicine on 12/3.  As per her daughter, patient has been non-verbal for the past three years and has HHA for the past nine years.   She tends to make sounds if she is wet, in pain and if the conversation around her is loud.   At times when she has anxiety, she becomes contracted.  At home, patient was taking Ativan daily, Tramadol daily and Tylenol.   Reference #: [id number], no record for patient.      PERTINENT PMH REVIEWED:  [x ] YES [ ] NO           SOCIAL HISTORY:  Significant other/partner:  [ ] YES  [x ] NO            Children:  [x ] YES  [ ] NO                   Rastafari/Spirituality:  Substance hx:  [ ] YES   [ x] NO           Tobacco hx:  [ ] YES  [ ] NO             Alcohol hx: [ ] YES  [ ] NO        Home Opioid hx:  [ ] YES  [ ] NO   Living Situation: [x ] Home  [ ] Long term care  [ ] Rehab    REFERRALS:   [ ] Chaplaincy  [ ] Hospice  [ ] Child Life  [ ] Social Work  [x ] Case management [ ] Holistic Therapy     FAMILY HISTORY:  No pertinent family history in first degree relatives    [ x] Family history non contributory     BASELINE ADLs (prior to admission):  Independent [ ] moderately [x ] fully   Dependent   [ ] moderately [x ] fully    ADVANCE DIRECTIVES:  [ ] YES [ ] NO   DNR [ ] YES [x ] NO                      MOLST  [x ] YES [ ] NO    Living Will  [ ] YES [ ] NO    Health Care Proxy [x ] YES  [ ] NO      [ ] Surrogate  [ x] HCP  [ ] Guardian:           Caron Esparza                 Phone#: [phone]    Allergies    No Known Allergies    Intolerances        MEDICATIONS  (STANDING):  dextrose 5%. 1000 milliLiter(s) (100 mL/Hr) IV Continuous <Continuous>  dextrose 5%. 1000 milliLiter(s) (50 mL/Hr) IV Continuous <Continuous>  dextrose 50% Injectable 12.5 Gram(s) IV Push once  dextrose 50% Injectable 12.5 Gram(s) IV Push once  dextrose 50% Injectable 25 Gram(s) IV Push once  dextrose 50% Injectable 25 Gram(s) IV Push once  docusate sodium 100 milliGRAM(s) Oral three times a day  heparin  Injectable 5000 Unit(s) SubCutaneous every 8 hours  influenza   Vaccine 0.5 milliLiter(s) IntraMuscular once  insulin glargine Injectable (LANTUS) 8 Unit(s) SubCutaneous at bedtime  insulin lispro (HumaLOG) corrective regimen sliding scale   SubCutaneous at bedtime  insulin lispro (HumaLOG) corrective regimen sliding scale   SubCutaneous three times a day before meals  lactobacillus acidophilus 1 Tablet(s) Oral two times a day with meals  levothyroxine Injectable 37 MICROGram(s) IV Push daily  senna 2 Tablet(s) Oral at bedtime  sodium bicarbonate 650 milliGRAM(s) Oral two times a day    MEDICATIONS  (PRN):  acetaminophen   Tablet. 325 milliGRAM(s) Oral every 6 hours PRN Mild Pain (1 - 3)  acetaminophen   Tablet. 650 milliGRAM(s) Oral every 6 hours PRN Moderate Pain (4 - 6)  bisacodyl Suppository 10 milliGRAM(s) Rectal daily PRN Constipation  dextrose Gel 1 Dose(s) Oral once PRN Blood Glucose LESS THAN 70 milliGRAM(s)/deciliter  glucagon  Injectable 1 milliGRAM(s) IntraMuscular once PRN Glucose LESS THAN 70 milligrams/deciliter  levalbuterol Inhalation 1.25 milliGRAM(s) Inhalation two times a day PRN shortness of breath and/or wheezing  oxyCODONE    5 mG/acetaminophen 325 mG 1 Tablet(s) Oral every 6 hours PRN Severe Pain (7 - 10)  polyethylene glycol 3350 17 Gram(s) Oral two times a day PRN Constipation      PRESENT SYMPTOMS:  Source: [ ] Patient   [x ] Family   [ ] Team     Pain: [ ] YES [x ] NO  OLDCARTS:     Dyspnea: [ ] YES [x ] NO   Anxiety: [x ] YES [ ] NO  Fatigue: [ ] YES [ x] NO   Nausea: [x ] YES [ ] NO  Loss of appetite: [ ] YES [x ] NO   Constipation: [x ] YES [ ] NO     Other Symptoms:  [ ] All other review of systems negative   [x ] Unable to obtain due to poor mentation     Karnofsky Performance Score/Palliative Performance Status Version 2:    20     %  Protein Calorie Malutrition:  [ ] Mild   [ ] Moderate   [ ] Severe     Vital Signs Last 24 Hrs  T(C): 36.8 (05 Dec 2017 06:48), Max: 37 (05 Dec 2017 02:00)  T(F): 98.2 (05 Dec 2017 06:48), Max: 98.6 (05 Dec 2017 02:00)  HR: 80 (05 Dec 2017 06:48) (80 - 88)  BP: 130/55 (05 Dec 2017 06:48) (115/66 - 149/70)  BP(mean): --  RR: 17 (05 Dec 2017 06:48) (17 - 18)  SpO2: 100% (05 Dec 2017 06:48) (99% - 100%)    Physical Exam:    General: [ ] Alert,  A&O x  O   [x ] lethargic   [ ] Agitated   [ ] Cachexia   HEENT: [ ] Normal   [ ] Dry mouth   [ ] ET Tube    [ ] Trach  Eyes closed for 2 years  Lungs:  normal breath sounds, [ ] Clear [ ] Rhonchi  [ ] Crackles [ ] Wheezing [ ] Tachypnea  [ ] Audible excessive secretions   Cardiovascular:  [x ] Regular rate and rhythm  [ ] Irregular [ ] Tachycardia   [ ] Bradycardia   Abdomen: [x ] Soft  [ ] Distended  [ ]  [ ] +BS  [ ] Non tender [ ] Tender  [ ]PEG   [ ] NGT   Last BM:     Genitourinary: [ ] Normal [ ] Incontinent   [ ] Oliguria/Anuria   [ ] Serna  Musculoskeletal:  [ ] Normal   [ ] Generalized weakness  [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 , quadreplegic   Neurological: [ ] No focal deficits  [ x] Cognitive impairment     Skin: [x ] Normal   [ ] Pressure ulcers     LABS:                        8.7    15.65 )-----------( 157      ( 05 Dec 2017 07:30 )             26.8     12-05    159<H>  |  127<H>  |  84<H>  ----------------------------<  236<H>  3.9   |  17<L>  |  2.04<H>    Ca    9.1      05 Dec 2017 07:30  Phos  4.4     12-05  Mg     2.4     12-05          I&O's Summary    04 Dec 2017 07:01  -  05 Dec 2017 07:00  --------------------------------------------------------  IN: 830 mL / OUT: 0 mL / NET: 830 mL        RADIOLOGY & ADDITIONAL STUDIES:

## 2017-12-05 NOTE — PROGRESS NOTE ADULT - SUBJECTIVE AND OBJECTIVE BOX
Follow Up:      Inverval History/ROS:Patient is a 89y old  Female who presents with a chief complaint of UTI and face swelling (01 Dec 2017 07:19)    No events    Allergies    No Known Allergies    Intolerances        ANTIMICROBIALS:      OTHER MEDS:  acetaminophen   Tablet. 325 milliGRAM(s) Oral every 6 hours PRN  acetaminophen   Tablet. 650 milliGRAM(s) Oral every 6 hours PRN  bisacodyl Suppository 10 milliGRAM(s) Rectal daily PRN  dextrose 5%. 1000 milliLiter(s) IV Continuous <Continuous>  dextrose 5%. 1000 milliLiter(s) IV Continuous <Continuous>  dextrose 50% Injectable 12.5 Gram(s) IV Push once  dextrose 50% Injectable 12.5 Gram(s) IV Push once  dextrose 50% Injectable 25 Gram(s) IV Push once  dextrose 50% Injectable 25 Gram(s) IV Push once  dextrose Gel 1 Dose(s) Oral once PRN  docusate sodium 100 milliGRAM(s) Oral three times a day  glucagon  Injectable 1 milliGRAM(s) IntraMuscular once PRN  heparin  Injectable 5000 Unit(s) SubCutaneous every 8 hours  influenza   Vaccine 0.5 milliLiter(s) IntraMuscular once  insulin glargine Injectable (LANTUS) 8 Unit(s) SubCutaneous at bedtime  insulin lispro (HumaLOG) corrective regimen sliding scale   SubCutaneous at bedtime  insulin lispro (HumaLOG) corrective regimen sliding scale   SubCutaneous three times a day before meals  lactobacillus acidophilus 1 Tablet(s) Oral two times a day with meals  levalbuterol Inhalation 1.25 milliGRAM(s) Inhalation two times a day PRN  levothyroxine Injectable 37 MICROGram(s) IV Push daily  oxyCODONE    5 mG/acetaminophen 325 mG 1 Tablet(s) Oral every 6 hours PRN  polyethylene glycol 3350 17 Gram(s) Oral two times a day PRN  senna 2 Tablet(s) Oral at bedtime  sodium bicarbonate 650 milliGRAM(s) Oral two times a day      Vital Signs Last 24 Hrs  T(C): 36.8 (05 Dec 2017 06:48), Max: 37 (05 Dec 2017 02:00)  T(F): 98.2 (05 Dec 2017 06:48), Max: 98.6 (05 Dec 2017 02:00)  HR: 80 (05 Dec 2017 06:48) (80 - 88)  BP: 130/55 (05 Dec 2017 06:48) (115/66 - 149/70)  BP(mean): --  RR: 17 (05 Dec 2017 06:48) (17 - 18)  SpO2: 100% (05 Dec 2017 06:48) (99% - 100%)    PHYSICAL EXAM:  General: [x ] non-toxic  HEAD/EYES: [ ] PERRL [ x] white sclera [xleft facial induration  s  ENT:  [ ] normal [ x] supple [ ] thrush [ ] pharyngeal exudate  Cardiovascular:   [ ] murmur [ x] normal [ ] PPM/AICD  Respiratory:  [ ] clear to ausculation bilaterally  GI:  [x ] soft, non-tender, normal bowel sounds  :  [ ] ewing [ ] no CVA tenderness   Musculoskeletal:  [ ] no synovitis  Neurologic:  [ ] non-focal exam   Skin:  [x ] no rash  Lymph: [ ] no lymphadenopathy  Psychiatric:  [ ] appropriate affect [ ] alert & oriented  Lines:  [ x] no phlebitis [ ] central line                                8.7    15.65 )-----------( 157      ( 05 Dec 2017 07:30 )             26.8       12-05    159<H>  |  127<H>  |  84<H>  ----------------------------<  236<H>  3.9   |  17<L>  |  2.04<H>    Ca    9.1      05 Dec 2017 07:30  Phos  4.4     12-05  Mg     2.4     12-05            MICROBIOLOGY:    RADIOLOGY:

## 2017-12-05 NOTE — CONSULT NOTE ADULT - PROBLEM SELECTOR RECOMMENDATION 9
Continue bowel regimen, Senna Q6h, PRN
2/2 parotitis - will need vancomycin dosing tonight   Please check level  Order echocardiogram  Transfer to the internal medicine service tomorrow AM for further medical management

## 2017-12-05 NOTE — SWALLOW BEDSIDE ASSESSMENT ADULT - ASR SWALLOW ASPIRATION MONITOR
fever/pneumonia/upper respiratory infection/change of breathing pattern/position upright (89Y)/cough/gurgly voice/oral hygiene/throat clearing

## 2017-12-06 LAB
BACTERIA BLD CULT: SIGNIFICANT CHANGE UP
BUN SERPL-MCNC: 79 MG/DL — HIGH (ref 7–23)
CALCIUM SERPL-MCNC: 8.8 MG/DL — SIGNIFICANT CHANGE UP (ref 8.4–10.5)
CHLORIDE SERPL-SCNC: 123 MMOL/L — HIGH (ref 98–107)
CO2 SERPL-SCNC: 17 MMOL/L — LOW (ref 22–31)
CREAT SERPL-MCNC: 1.88 MG/DL — HIGH (ref 0.5–1.3)
GLUCOSE SERPL-MCNC: 172 MG/DL — HIGH (ref 70–99)
HCT VFR BLD CALC: 24.8 % — LOW (ref 34.5–45)
HGB BLD-MCNC: 8 G/DL — LOW (ref 11.5–15.5)
MAGNESIUM SERPL-MCNC: 2.4 MG/DL — SIGNIFICANT CHANGE UP (ref 1.6–2.6)
MCHC RBC-ENTMCNC: 31.4 PG — SIGNIFICANT CHANGE UP (ref 27–34)
MCHC RBC-ENTMCNC: 32.3 % — SIGNIFICANT CHANGE UP (ref 32–36)
MCV RBC AUTO: 97.3 FL — SIGNIFICANT CHANGE UP (ref 80–100)
NRBC # FLD: 0 — SIGNIFICANT CHANGE UP
PHOSPHATE SERPL-MCNC: 4.2 MG/DL — SIGNIFICANT CHANGE UP (ref 2.5–4.5)
PLATELET # BLD AUTO: 166 K/UL — SIGNIFICANT CHANGE UP (ref 150–400)
PMV BLD: 14.3 FL — HIGH (ref 7–13)
POTASSIUM SERPL-MCNC: 3.9 MMOL/L — SIGNIFICANT CHANGE UP (ref 3.5–5.3)
POTASSIUM SERPL-SCNC: 3.9 MMOL/L — SIGNIFICANT CHANGE UP (ref 3.5–5.3)
RBC # BLD: 2.55 M/UL — LOW (ref 3.8–5.2)
RBC # FLD: 14.6 % — HIGH (ref 10.3–14.5)
SODIUM SERPL-SCNC: 155 MMOL/L — HIGH (ref 135–145)
VANCOMYCIN TROUGH SERPL-MCNC: 19.9 UG/ML — SIGNIFICANT CHANGE UP (ref 10–20)
WBC # BLD: 24.41 K/UL — HIGH (ref 3.8–10.5)
WBC # FLD AUTO: 24.41 K/UL — HIGH (ref 3.8–10.5)

## 2017-12-06 PROCEDURE — 70490 CT SOFT TISSUE NECK W/O DYE: CPT | Mod: 26

## 2017-12-06 PROCEDURE — 99233 SBSQ HOSP IP/OBS HIGH 50: CPT

## 2017-12-06 PROCEDURE — 74230 X-RAY XM SWLNG FUNCJ C+: CPT | Mod: 26

## 2017-12-06 RX ORDER — SODIUM CHLORIDE 9 MG/ML
1000 INJECTION, SOLUTION INTRAVENOUS
Qty: 0 | Refills: 0 | Status: DISCONTINUED | OUTPATIENT
Start: 2017-12-06 | End: 2017-12-08

## 2017-12-06 RX ORDER — VANCOMYCIN HCL 1 G
500 VIAL (EA) INTRAVENOUS
Qty: 0 | Refills: 0 | Status: DISCONTINUED | OUTPATIENT
Start: 2017-12-06 | End: 2017-12-09

## 2017-12-06 RX ADMIN — Medication 100 MILLIGRAM(S): at 23:32

## 2017-12-06 RX ADMIN — Medication 100 MILLIGRAM(S): at 19:48

## 2017-12-06 RX ADMIN — SODIUM CHLORIDE 125 MILLILITER(S): 9 INJECTION, SOLUTION INTRAVENOUS at 12:19

## 2017-12-06 RX ADMIN — HEPARIN SODIUM 5000 UNIT(S): 5000 INJECTION INTRAVENOUS; SUBCUTANEOUS at 23:33

## 2017-12-06 RX ADMIN — INSULIN GLARGINE 8 UNIT(S): 100 INJECTION, SOLUTION SUBCUTANEOUS at 23:33

## 2017-12-06 RX ADMIN — SENNA PLUS 2 TABLET(S): 8.6 TABLET ORAL at 23:33

## 2017-12-06 RX ADMIN — Medication 1: at 23:34

## 2017-12-06 RX ADMIN — Medication 37 MICROGRAM(S): at 06:30

## 2017-12-06 RX ADMIN — Medication 1 TABLET(S): at 17:37

## 2017-12-06 RX ADMIN — Medication 1: at 06:46

## 2017-12-06 RX ADMIN — Medication 1: at 17:37

## 2017-12-06 RX ADMIN — Medication 650 MILLIGRAM(S): at 17:37

## 2017-12-06 RX ADMIN — Medication 1: at 12:19

## 2017-12-06 RX ADMIN — HEPARIN SODIUM 5000 UNIT(S): 5000 INJECTION INTRAVENOUS; SUBCUTANEOUS at 14:22

## 2017-12-06 RX ADMIN — HEPARIN SODIUM 5000 UNIT(S): 5000 INJECTION INTRAVENOUS; SUBCUTANEOUS at 06:29

## 2017-12-06 NOTE — SWALLOW VFSS/MBS ASSESSMENT ADULT - PHARYNGEAL PHASE COMMENTS
delayed initiation of the pharyngeal swallow, adequate laryngeal elevation, reduced tongue base retraction, adequate pharyngeal constriction

## 2017-12-06 NOTE — SWALLOW VFSS/MBS ASSESSMENT ADULT - RECOMMENDED CONSISTENCY
1.) Puree with Honey Thick Liquids with Total Feeding Assistance.  2.) Feeding/Swallowing Guidelines: Sit upright, provide teaspoon amount at a time for puree (3 to 5 cc), allow patient to swallow prior to next presentation, Honey Thick Liquids via small single cup sips; alternate with a liquid wash of honey thick liquids after every 2 to 3 puree intake; can utilize syringe (3 to 5 cc) for puree given patient tends to have a tonic bite reflex onto the utensil presentations.   3.) Aspiration Precautions  4.) Maintain Good Oral Hygiene Care   5.) Dietary Consultation as PO may be guarded to meet caloric needs and may benefit oral supplements to maximize caloric intake. 1.) Puree with Honey Thick Liquids with Total Feeding Assistance.  2.) Feeding/Swallowing Guidelines: Sit upright, provide teaspoon amount at a time for puree (3 cc), allow patient to swallow prior to next presentation, Honey Thick Liquids via small single cup sips; alternate with a liquid wash of honey thick liquids after every 2 to 3 puree intake; can utilize syringe (3 cc) for puree given patient tends to have a tonic bite reflex onto the utensil presentations.   3.) Aspiration Precautions  4.) Maintain Good Oral Hygiene Care   5.) Dietary Consultation as PO may be guarded to meet caloric needs and may benefit oral supplements to maximize caloric intake.

## 2017-12-06 NOTE — DIETITIAN INITIAL EVALUATION ADULT. - PERTINENT MEDS FT
Vancomycin, Lantus, Ativan, Dulcolax, Humalog sliding scale, lactobacillus acidophilous, sodium bicarbonate, Miralax, senna

## 2017-12-06 NOTE — SWALLOW VFSS/MBS ASSESSMENT ADULT - ROSENBEK'S PENETRATION ASPIRATION SCALE
(1) no aspiration, contrast does not enter airway (2) contrast enters airway, remains above the vocal cords, no residue remains (penetration) unable to assess due to severity of oral stage deficit with majority of bolus anterior loss/spillage from oral cavity

## 2017-12-06 NOTE — ADVANCED PRACTICE NURSE CONSULT - REASON FOR CONSULT
Patient seen on skin care rounds after wound care referral received for assessment of skin impairment and recommendations of topical management. Chart reviewed: WBC 24.21k/uL, H/H 8.0/24.8, Plt 166, Serum albumin 3.7g/dL, Neymar 6, patient's daughter at bedside interviewed. Patient bedbound, non-responsive at baseline lives with daughter and has home health aid. Patient H/O of CKD stage 4, advanced dementia (non verbal and bed bound), and recurrent UTIs presents with L cheek swelling consistent with parotitis.  Course complicated by MRSA bacteremia on IV vancomycin. Patient see and/or followed by MICU for airway evaluation, ENT for facial swelling/parotitis, SICU for impending airway compromise, Ethic committee, Infectious Disease for MRSA bacteremia, Palliative care for pain management, and medicine team.

## 2017-12-06 NOTE — PROGRESS NOTE ADULT - PROBLEM SELECTOR PLAN 1
- ENT recs appreciated. Continue IV abx as per ID and parotid gland massage (3-4x daily), family and home aide continues to do this  - Pt no longer on steroids   - Warm compresses to area   - Pain control PRN  - With rising WBC, plan to repeat non-contrast CT neck, dominic off area of edema to keep track of whether it is increasing in size or not  - No surgical intervention planned at this time - ENT recs appreciated. Continue IV abx as per ID and parotid gland massage (3-4x daily), family and home aide continues to do this  - Pt no longer on steroids   - Warm compresses to area   - Pain control PRN  - With rising WBC, plan to repeat non-contrast CT neck, dominic off area of edema to keep track of whether it is increasing in size or not. Case d/w ID. Dr. Blanchard  - No surgical intervention planned at this time

## 2017-12-06 NOTE — SWALLOW VFSS/MBS ASSESSMENT ADULT - ADDITIONAL RECOMMENDATIONS
SLP provided Patient's daughter and HHA regarding results/recommendations. Both verbalized understanding and will follow up with Physician for management.

## 2017-12-06 NOTE — SWALLOW VFSS/MBS ASSESSMENT ADULT - DIAGNOSTIC IMPRESSIONS
Patient presents with Moderate to Severe Oral Stage and Mild Pharyngeal Stage Dysphagia. The Oral Stage is characterized by reduced oral containment with profuse anterior loss/spillage out of the oral cavity for Thin Liquids via cup sip presentation due to reduced lip seal/closure, exhibiting tonic bite reflex for utensil teaspoon presentation for puree trials. Once the Bolus is in the oral cavity; there is slow bolus manipulation, slow tongue motion with slow anterior to posterior transfer of the bolus for puree/honey thick liquids/nectar thick liquid trials. There is trace/mild oral clearance deficit along the posterior base of tongue post primary swallow.   The Pharyngeal Stage is characterized by delayed initiation of the pharyngeal swallow (bolus head is at the vallecula for puree; bolus head is under the laryngeal surface of the epiglottis for nectar thick liquids), adequate laryngeal elevation, mildly reduced tongue base retraction resulting in trace/mild vallecular residue post primary swallow and adequate pharyngeal constriction. There is trace/mild pharyngeal clearance deficit located primarily in the base of tongue/vallecular post primary swallow.  There was Deep Laryngeal Penetration during the swallow for Nectar Thick Liquids with retrieval leaving No residue in the laryngeal vestibule and airway protection maintained.  There was intermittent Laryngeal Penetration during the swallow for Honey Thick Liquids with retrieval leaving No residue in the laryngeal vestibule and airway protection maintained.  There was No Aspiration observed before, during or after the swallow for Puree/Honey Thick Liquids. Patient presents with Moderate to Severe Oral Stage and Mild Pharyngeal Stage Dysphagia. The Oral Stage is characterized by reduced oral containment with profuse anterior loss/spillage out of the oral cavity for Thin Liquids via cup sip presentation due to reduced lip seal/closure, exhibiting tonic bite reflex for utensil teaspoon presentation for puree trials with minimal to no attempts to strip from teaspoon presentation despite tactile cueing onto the lips. Once the Bolus is in the oral cavity; there is slow bolus manipulation, slow tongue motion with slow anterior to posterior transfer of the bolus for puree/honey thick liquids/nectar thick liquid trials. There is trace/mild oral clearance deficit along the posterior base of tongue post primary swallow.   The Pharyngeal Stage is characterized by delayed initiation of the pharyngeal swallow (bolus head is at the vallecula for puree; bolus head is under the laryngeal surface of the epiglottis for nectar thick liquids), adequate laryngeal elevation, mildly reduced tongue base retraction resulting in trace/mild vallecular residue post primary swallow and adequate pharyngeal constriction. There is trace/mild pharyngeal clearance deficit located primarily in the base of tongue/vallecular post primary swallow.  There was Deep Laryngeal Penetration during the swallow for Nectar Thick Liquids with retrieval leaving No residue in the laryngeal vestibule and airway protection maintained.  There was intermittent Laryngeal Penetration during the swallow for Honey Thick Liquids with retrieval leaving No residue in the laryngeal vestibule and airway protection maintained.  There was No Aspiration observed before, during or after the swallow for Puree/Honey Thick Liquids. Patient presents with Moderate to Severe Oral Stage and Mild Pharyngeal Stage Dysphagia. The Oral Stage is characterized by reduced oral containment with profuse anterior loss/spillage out of the oral cavity for Thin Liquids via cup sip presentation due to reduced lip seal/closure, exhibiting tonic bite reflex for utensil teaspoon presentation for puree trials with minimal to no attempts to strip from teaspoon presentation despite tactile cueing onto the lips. Once the Bolus is in the oral cavity; there is slow bolus manipulation, slow tongue motion with slow anterior to posterior transfer of the bolus for puree/honey thick liquids/nectar thick liquid trials. There is trace/mild oral clearance deficit along the posterior base of tongue post primary swallow.   The Pharyngeal Stage is characterized by delayed initiation of the pharyngeal swallow (bolus head is at the vallecula for puree; bolus head is under the laryngeal surface of the epiglottis for nectar thick liquids), adequate laryngeal elevation, mildly reduced tongue base retraction resulting in trace/mild vallecular residue post primary swallow and adequate pharyngeal constriction. There is trace/mild pharyngeal clearance deficit located primarily in the base of tongue/vallecular post primary swallow.  Patient produces spontanesous intermittent reswallow to assist with clearance. There was Deep Laryngeal Penetration during the swallow for Nectar Thick Liquids with retrieval leaving No residue in the laryngeal vestibule and airway protection maintained.  There was intermittent Laryngeal Penetration during the swallow for Honey Thick Liquids with retrieval leaving No residue in the laryngeal vestibule and airway protection maintained.  There was No Aspiration observed before, during or after the swallow for Puree/Honey Thick Liquids.

## 2017-12-06 NOTE — ADVANCED PRACTICE NURSE CONSULT - ASSESSMENT
General: Patient non-responsive (consistent with baseline, as per daughter and HHA at bedside), facial swelling noted, bedbound, protruding spinal vertebrae (prophylactic silicone foams without border in place for protection), contracted and rigid upper extremities and hands, contracted Right lower extremity flexed at the knee and everted, Left lower leg extending and rigid difficult to flex. Patient incontinent of urine and stool. Skin warm, dry with increased moisture in intertriginous folds, adequate skin turgor, scattered areas of hyperpigmentation and hypopigmentation, scattered areas of ecchymosis on bilateral upper extremities. Blanchable erythema on bilateral heels, right medial-distal great toe, left distal great toe. Reactive hyperemia noted on bilateral lateral rib cages, resolves when turned to opposite side.     Sacrococcygeum- mixed etiology suspected deep tissue injury with skin slippage and incontinence associated dermatitis- entire area measures 3cmx2.5cmx0.2cm, 75% pink-moist, flat exposed dermis with macerated edges, surrounded from 6-12 o'clock by 25% purple-maroon discoloration extending 0.5-1.5cm with 1.5cm at 6 o'clock. (+) induration beneath purple-maroon discoloration. Periwound skin with evidence of healed impaired skin integrity secondary to incontinence associated dermatitis presenting as scattered areas of hypopigmentation. Chronic hyperpigmentation noted extending through sacral fold to perianal area. Goal of care: monitor for changes in tissue type, maintain moist environment to promote wound healing, protect periwound skin. Protect areas of IAD from urine and/or stool.

## 2017-12-06 NOTE — SWALLOW VFSS/MBS ASSESSMENT ADULT - ORAL PREP COMMENTS
tonic bite reflex noted for utensil presentation with intermittent oral opening for cup sip presentation anterior loss/spillage from oral cavity tonic bite reflex for utensil presentation; utilized syringe to assist for feeding

## 2017-12-06 NOTE — DIETITIAN INITIAL EVALUATION ADULT. - NS AS NUTRI INTERV MEALS SNACK
1. Recommend to continue Dysphagia Level 1 Pureed diet with Honey thickened liquids, Prostat x1 daily. 2. Recommend to order Nepro 2x daily (850 kcals, 38.2g protein) (nectar thickened). 3. Provide assistance at meal times and maintain aspiration precautions. 4. Monitor future goals of care in order to provide appropriate nutrition intervention. 1. Recommend to continue Dysphagia Level 1 Pureed diet with Honey thickened liquids, Prostat x1 daily. 2. Recommend to order Nepro 2x daily (850 kcals, 38.2g protein) (honey thickened). 3. Provide assistance at meal times and maintain aspiration precautions. 4. Monitor future goals of care in order to provide appropriate nutrition intervention.

## 2017-12-06 NOTE — DIETITIAN INITIAL EVALUATION ADULT. - ENERGY NEEDS
Weight: 111# (50kg) (11/29)  Height: 62 inches  BMI: 20 kg/m^2  IBW: 110# (50kg) Weight: 111# (50kg) (11/29)  Height: 62 inches  BMI: 20 kg/m^2  IBW: 110# (50kg)  Skin: stage 2 DTI to sacrum

## 2017-12-06 NOTE — ADVANCED PRACTICE NURSE CONSULT - RECOMMEDATIONS
Topical Recommendations:  Sacrococcygeum- mixed etiology suspected deep tissue injury with skin slippage and incontinence associated dermatitis- Cleanse wound and periwound skin with SAF-clens, rinse with NS, pat dry. Apply Liquid barrier film to periwound skin. Cover with Silicone foam with border. Change daily.    Incontinence associated dermatitis of bilateral buttocks- provide perineal care twice a day and prn with episodes of incontinence. Apply TRIAD moisture barrier paste to bilateral buttocks and perineum.    Continue low air loss bed therapy, continue heel elevation with Z-flex fluidized positioning boots, continue to turn & reposition q2h with Z-marcel fluidized positioning device, soft pillow between bony prominences, continue incontinence management as recommended above & single breathable pad, continue measures to decrease friction/shear/pressure.     Continue with nutritional support as per dietary/orders.    Findings and plan discussed with patient's daughter and home health aid at bedside, all questions and concerns addressed.     Please contact Wound Care Service Line if we can be of further assistance (ext [phone]).

## 2017-12-06 NOTE — DIETITIAN INITIAL EVALUATION ADULT. - OTHER INFO
Pt seen for nutrition consult. Pt with medical history of end stage dementia, CKD Stage 3 (baseline Cr 1.75), hypothyroidism, frequent UTIs p/w left facial swelling consistent with parotitis. Course complicated by MRSA bacteremia. Daughter reports patient had good appetite/PO intake PTA. Daughter provides patient with ground/chopped foods and limits potassium rich foods given patient with CKD (not on HD). Pt would consume 3 meals plus snacks daily. S/P SLP bedside assessment (12/05) recommended patient to stay NPO with follow-up MBS to assess function. S/P MBS (12/06) with recommendation for Level 1 Dysphagia Pureed diet honey thickened fluids. Provided in depth discussion and written materials regarding pureed diet with thickened liquids. Daughter amenable to receive Nepro supplementation in-house. No GI distress (nausea/vomiting/diarrhea/constipation.) NKFA. Pt's aid reports patient looks thinner and endorses weight loss over 1 year. Of note patient ordered for NG tube feeds of Nepro which were discontinued the same day (11/30). Unsure of tube feed provisions. Palliative care is following. Further goals of care discussions would be beneficial.

## 2017-12-06 NOTE — DIETITIAN INITIAL EVALUATION ADULT. - DIET TYPE
No carb prosource 1x daily (15gm protein)/consistent carbohydrate (no snacks)/dysphagia 1, pureed, honey consistency fluid

## 2017-12-06 NOTE — PROGRESS NOTE ADULT - SUBJECTIVE AND OBJECTIVE BOX
Patient is a 89y old  Female who presents with a chief complaint of UTI and face swelling (01 Dec 2017 07:19)      SUBJECTIVE / OVERNIGHT EVENTS: No overnight events. Pt is mute with advanced dementia unable to provide ROS    MEDICATIONS  (STANDING): reviewed  dextrose 5%. 1000 milliLiter(s) (125 mL/Hr) IV Continuous <Continuous>  dextrose 5%. 1000 milliLiter(s) (50 mL/Hr) IV Continuous <Continuous>  dextrose 50% Injectable 12.5 Gram(s) IV Push once  dextrose 50% Injectable 12.5 Gram(s) IV Push once  dextrose 50% Injectable 25 Gram(s) IV Push once  dextrose 50% Injectable 25 Gram(s) IV Push once  docusate sodium 100 milliGRAM(s) Oral three times a day  heparin  Injectable 5000 Unit(s) SubCutaneous every 8 hours  influenza   Vaccine 0.5 milliLiter(s) IntraMuscular once  insulin glargine Injectable (LANTUS) 8 Unit(s) SubCutaneous at bedtime  insulin lispro (HumaLOG) corrective regimen sliding scale   SubCutaneous at bedtime  insulin lispro (HumaLOG) corrective regimen sliding scale   SubCutaneous three times a day before meals  lactobacillus acidophilus 1 Tablet(s) Oral two times a day with meals  levothyroxine Injectable 37 MICROGram(s) IV Push daily  senna 2 Tablet(s) Oral at bedtime  sodium bicarbonate 650 milliGRAM(s) Oral two times a day  vancomycin  IVPB 750 milliGRAM(s) IV Intermittent every 48 hours    MEDICATIONS  (PRN):  acetaminophen   Tablet. 325 milliGRAM(s) Oral every 6 hours PRN Mild Pain (1 - 3)  acetaminophen   Tablet. 650 milliGRAM(s) Oral every 6 hours PRN Moderate Pain (4 - 6)  bisacodyl Suppository 10 milliGRAM(s) Rectal daily PRN Constipation  dextrose Gel 1 Dose(s) Oral once PRN Blood Glucose LESS THAN 70 milliGRAM(s)/deciliter  glucagon  Injectable 1 milliGRAM(s) IntraMuscular once PRN Glucose LESS THAN 70 milligrams/deciliter  HYDROmorphone  Injectable 0.2 milliGRAM(s) IV Push every 3 hours PRN Mild to severe Pain  levalbuterol Inhalation 1.25 milliGRAM(s) Inhalation two times a day PRN shortness of breath and/or wheezing  LORazepam   Injectable 0.25 milliGRAM(s) IV Push every 6 hours PRN Anxiety  polyethylene glycol 3350 17 Gram(s) Oral two times a day PRN Constipation        CAPILLARY BLOOD GLUCOSE      POCT Blood Glucose.: 162 mg/dL (06 Dec 2017 12:16)  POCT Blood Glucose.: 183 mg/dL (06 Dec 2017 08:41)  POCT Blood Glucose.: 177 mg/dL (06 Dec 2017 06:37)  POCT Blood Glucose.: 258 mg/dL (05 Dec 2017 22:12)  POCT Blood Glucose.: 292 mg/dL (05 Dec 2017 17:41)    I&O's Summary    05 Dec 2017 07:01  -  06 Dec 2017 07:00  --------------------------------------------------------  IN: 2150 mL / OUT: 0 mL / NET: 2150 mL    06 Dec 2017 07:01  -  06 Dec 2017 15:13  --------------------------------------------------------  IN: 900 mL / OUT: 0 mL / NET: 900 mL    Vital Signs Last 24 Hrs  T(C): 36.8 (06 Dec 2017 14:00), Max: 36.9 (05 Dec 2017 17:34)  T(F): 98.2 (06 Dec 2017 14:00), Max: 98.4 (05 Dec 2017 17:34)  HR: 80 (06 Dec 2017 14:00) (80 - 93)  BP: 112/60 (06 Dec 2017 14:00) (106/64 - 122/57)  BP(mean): --  RR: 18 (06 Dec 2017 14:00) (17 - 18)  SpO2: 98% (06 Dec 2017 14:00) (98% - 100%)    PHYSICAL EXAM:  GENERAL: No acute distress  HEENT: eyes closed, L parotid edematous, mild erythema noted on skin  NECK: Supple  CHEST/LUNG: Clear to auscultation bilaterally  HEART: S1/S2, Regular rate and rhythm, crescendo decrescendo SABINE loudest in RUSB  ABDOMEN: Nondistended, NABS, soft,  nontender  EXTREMITIES:  contracted, 2+ Peripheral Pulses, no clubbing, cyanosis, or edema  NEUROLOGY: no strength  SKIN: stage 2 sacral decubitus ulcer    LABS:             rising WBC               8.0    24.41 )-----------( 166      ( 06 Dec 2017 06:30 )             24.8     12-06    155<H>  |  123<H>  |  79<H>  ----------------------------<  172<H>  3.9   |  17<L>  |  1.88<H>    Ca    8.8      06 Dec 2017 06:30  Phos  4.2     12-06  Mg     2.4     12-06    RADIOLOGY & ADDITIONAL TESTS:    Imaging Personally Reviewed: Cine esophagram-< from: Xray Cinesophagram (12.06.17 @ 11:35) >  There is no evidence of penetration, aspiration or stasis with the tested   consistencies.    < end of copied text >      Consultant(s) Notes Reviewed:      Care Discussed with Consultants/Other Providers: Patient is a 89y old  Female who presents with a chief complaint of UTI and face swelling (01 Dec 2017 07:19)      SUBJECTIVE / OVERNIGHT EVENTS: No overnight events. Pt is mute with advanced dementia unable to provide ROS    Pt evaluated by wound care this am. Had a small BM. As per daughter, pt was coughing this am "trying to clear her throat".    MEDICATIONS  (STANDING): reviewed  dextrose 5%. 1000 milliLiter(s) (125 mL/Hr) IV Continuous <Continuous>  dextrose 5%. 1000 milliLiter(s) (50 mL/Hr) IV Continuous <Continuous>  dextrose 50% Injectable 12.5 Gram(s) IV Push once  dextrose 50% Injectable 12.5 Gram(s) IV Push once  dextrose 50% Injectable 25 Gram(s) IV Push once  dextrose 50% Injectable 25 Gram(s) IV Push once  docusate sodium 100 milliGRAM(s) Oral three times a day  heparin  Injectable 5000 Unit(s) SubCutaneous every 8 hours  influenza   Vaccine 0.5 milliLiter(s) IntraMuscular once  insulin glargine Injectable (LANTUS) 8 Unit(s) SubCutaneous at bedtime  insulin lispro (HumaLOG) corrective regimen sliding scale   SubCutaneous at bedtime  insulin lispro (HumaLOG) corrective regimen sliding scale   SubCutaneous three times a day before meals  lactobacillus acidophilus 1 Tablet(s) Oral two times a day with meals  levothyroxine Injectable 37 MICROGram(s) IV Push daily  senna 2 Tablet(s) Oral at bedtime  sodium bicarbonate 650 milliGRAM(s) Oral two times a day  vancomycin  IVPB 750 milliGRAM(s) IV Intermittent every 48 hours    MEDICATIONS  (PRN):  acetaminophen   Tablet. 325 milliGRAM(s) Oral every 6 hours PRN Mild Pain (1 - 3)  acetaminophen   Tablet. 650 milliGRAM(s) Oral every 6 hours PRN Moderate Pain (4 - 6)  bisacodyl Suppository 10 milliGRAM(s) Rectal daily PRN Constipation  dextrose Gel 1 Dose(s) Oral once PRN Blood Glucose LESS THAN 70 milliGRAM(s)/deciliter  glucagon  Injectable 1 milliGRAM(s) IntraMuscular once PRN Glucose LESS THAN 70 milligrams/deciliter  HYDROmorphone  Injectable 0.2 milliGRAM(s) IV Push every 3 hours PRN Mild to severe Pain  levalbuterol Inhalation 1.25 milliGRAM(s) Inhalation two times a day PRN shortness of breath and/or wheezing  LORazepam   Injectable 0.25 milliGRAM(s) IV Push every 6 hours PRN Anxiety  polyethylene glycol 3350 17 Gram(s) Oral two times a day PRN Constipation        CAPILLARY BLOOD GLUCOSE      POCT Blood Glucose.: 162 mg/dL (06 Dec 2017 12:16)  POCT Blood Glucose.: 183 mg/dL (06 Dec 2017 08:41)  POCT Blood Glucose.: 177 mg/dL (06 Dec 2017 06:37)  POCT Blood Glucose.: 258 mg/dL (05 Dec 2017 22:12)  POCT Blood Glucose.: 292 mg/dL (05 Dec 2017 17:41)    I&O's Summary    05 Dec 2017 07:01  -  06 Dec 2017 07:00  --------------------------------------------------------  IN: 2150 mL / OUT: 0 mL / NET: 2150 mL    06 Dec 2017 07:01  -  06 Dec 2017 15:13  --------------------------------------------------------  IN: 900 mL / OUT: 0 mL / NET: 900 mL    Vital Signs Last 24 Hrs  T(C): 36.8 (06 Dec 2017 14:00), Max: 36.9 (05 Dec 2017 17:34)  T(F): 98.2 (06 Dec 2017 14:00), Max: 98.4 (05 Dec 2017 17:34)  HR: 80 (06 Dec 2017 14:00) (80 - 93)  BP: 112/60 (06 Dec 2017 14:00) (106/64 - 122/57)  BP(mean): --  RR: 18 (06 Dec 2017 14:00) (17 - 18)  SpO2: 98% (06 Dec 2017 14:00) (98% - 100%)    PHYSICAL EXAM:  GENERAL: No acute distress  HEENT: eyes closed, L parotid edematous, mild erythema noted on skin  NECK: Supple  CHEST/LUNG: Clear to auscultation bilaterally  HEART: S1/S2, Regular rate and rhythm, crescendo decrescendo SABINE loudest in RUSB  ABDOMEN: Nondistended, NABS, soft,  nontender  EXTREMITIES:  contracted, 2+ Peripheral Pulses, no clubbing, cyanosis, or edema  NEUROLOGY: no strength  SKIN: stage 2 sacral decubitus ulcer    LABS:             rising WBC               8.0    24.41 )-----------( 166      ( 06 Dec 2017 06:30 )             24.8     12-06    155<H>  |  123<H>  |  79<H>  ----------------------------<  172<H>  3.9   |  17<L>  |  1.88<H>    Ca    8.8      06 Dec 2017 06:30  Phos  4.2     12-06  Mg     2.4     12-06    RADIOLOGY & ADDITIONAL TESTS:    Imaging Personally Reviewed: Cine esophagram-< from: Xray Cinesophagram (12.06.17 @ 11:35) >  There is no evidence of penetration, aspiration or stasis with the tested   consistencies.    < end of copied text >      Consultant(s) Notes Reviewed:      Care Discussed with Consultants/Other Providers:

## 2017-12-07 DIAGNOSIS — Z71.89 OTHER SPECIFIED COUNSELING: ICD-10-CM

## 2017-12-07 DIAGNOSIS — F03.90 UNSPECIFIED DEMENTIA WITHOUT BEHAVIORAL DISTURBANCE: ICD-10-CM

## 2017-12-07 LAB
BACTERIA BLD CULT: SIGNIFICANT CHANGE UP
VANCOMYCIN TROUGH SERPL-MCNC: 18 UG/ML — SIGNIFICANT CHANGE UP (ref 10–20)

## 2017-12-07 PROCEDURE — 99232 SBSQ HOSP IP/OBS MODERATE 35: CPT

## 2017-12-07 PROCEDURE — 99233 SBSQ HOSP IP/OBS HIGH 50: CPT

## 2017-12-07 PROCEDURE — 76937 US GUIDE VASCULAR ACCESS: CPT | Mod: 26

## 2017-12-07 PROCEDURE — 36569 INSJ PICC 5 YR+ W/O IMAGING: CPT

## 2017-12-07 PROCEDURE — 77001 FLUOROGUIDE FOR VEIN DEVICE: CPT | Mod: 26,GC

## 2017-12-07 RX ORDER — INSULIN GLARGINE 100 [IU]/ML
12 INJECTION, SOLUTION SUBCUTANEOUS AT BEDTIME
Qty: 0 | Refills: 0 | Status: DISCONTINUED | OUTPATIENT
Start: 2017-12-07 | End: 2017-12-08

## 2017-12-07 RX ORDER — HYDROMORPHONE HYDROCHLORIDE 2 MG/ML
0.2 INJECTION INTRAMUSCULAR; INTRAVENOUS; SUBCUTANEOUS
Qty: 0 | Refills: 0 | Status: DISCONTINUED | OUTPATIENT
Start: 2017-12-07 | End: 2017-12-09

## 2017-12-07 RX ORDER — SODIUM CHLORIDE 9 MG/ML
250 INJECTION INTRAMUSCULAR; INTRAVENOUS; SUBCUTANEOUS ONCE
Qty: 0 | Refills: 0 | Status: COMPLETED | OUTPATIENT
Start: 2017-12-07 | End: 2017-12-07

## 2017-12-07 RX ADMIN — Medication 100 MILLIGRAM(S): at 07:04

## 2017-12-07 RX ADMIN — SENNA PLUS 2 TABLET(S): 8.6 TABLET ORAL at 22:37

## 2017-12-07 RX ADMIN — SODIUM CHLORIDE 500 MILLILITER(S): 9 INJECTION INTRAMUSCULAR; INTRAVENOUS; SUBCUTANEOUS at 19:40

## 2017-12-07 RX ADMIN — SODIUM CHLORIDE 125 MILLILITER(S): 9 INJECTION, SOLUTION INTRAVENOUS at 22:44

## 2017-12-07 RX ADMIN — Medication 650 MILLIGRAM(S): at 07:04

## 2017-12-07 RX ADMIN — INSULIN GLARGINE 12 UNIT(S): 100 INJECTION, SOLUTION SUBCUTANEOUS at 22:37

## 2017-12-07 RX ADMIN — Medication 100 MILLIGRAM(S): at 13:03

## 2017-12-07 RX ADMIN — Medication 3: at 19:34

## 2017-12-07 RX ADMIN — HEPARIN SODIUM 5000 UNIT(S): 5000 INJECTION INTRAVENOUS; SUBCUTANEOUS at 22:38

## 2017-12-07 RX ADMIN — Medication: at 12:50

## 2017-12-07 RX ADMIN — HEPARIN SODIUM 5000 UNIT(S): 5000 INJECTION INTRAVENOUS; SUBCUTANEOUS at 13:03

## 2017-12-07 RX ADMIN — Medication 3: at 09:23

## 2017-12-07 RX ADMIN — Medication 37 MICROGRAM(S): at 07:49

## 2017-12-07 RX ADMIN — Medication 1: at 22:55

## 2017-12-07 RX ADMIN — Medication 1 TABLET(S): at 19:40

## 2017-12-07 RX ADMIN — Medication 1 TABLET(S): at 07:04

## 2017-12-07 RX ADMIN — Medication 650 MILLIGRAM(S): at 19:38

## 2017-12-07 RX ADMIN — HEPARIN SODIUM 5000 UNIT(S): 5000 INJECTION INTRAVENOUS; SUBCUTANEOUS at 07:03

## 2017-12-07 RX ADMIN — SODIUM CHLORIDE 500 MILLILITER(S): 9 INJECTION INTRAMUSCULAR; INTRAVENOUS; SUBCUTANEOUS at 15:35

## 2017-12-07 RX ADMIN — SODIUM CHLORIDE 125 MILLILITER(S): 9 INJECTION, SOLUTION INTRAVENOUS at 09:25

## 2017-12-07 NOTE — PROGRESS NOTE ADULT - SUBJECTIVE AND OBJECTIVE BOX
HPI:  Patient is observed to be sitting up in bed with her eyes closed, alert and oriented x O.  She did not receive any prn medications overnight. As per daughter who is present in the room, patient has been resting with no agitation or aggression at bedtime.  =  PERTINENT PMH REVIEWED:  [x ] YES [ ] NO           SOCIAL HISTORY:  Significant other/partner:  [ ] YES  [ ] NO            Children:  [x ] YES  [ ] NO                   Sikhism/Spirituality:  Substance hx:  [ ] YES   [ ] NO           Tobacco hx:  [ ] YES  [ ] NO             Alcohol hx: [ ] YES  [ ] NO        Home Opioid hx:  [ ] YES  [ ] NO   Living Situation: [x ] Home  [ ] Long term care  [ ] Rehab    REFERRALS:   [ ] Chaplaincy  [x ] Hospice  [ ] Child Life  [ ] Social Work  [ ] Case management [ ] Holistic Therapy     FAMILY HISTORY:  No pertinent family history in first degree relatives    [ ] Family history non contributory     BASELINE ADLs (prior to admission):  Independent [ ] moderately [x ] fully   Dependent   [ ] moderately [x ] fully    ADVANCE DIRECTIVES:  [ ] YES [ ] NO   DNR [ ] YES [ ] NO                      MOLST  [x ] YES [ ] NO    Living Will  [ ] YES [ ] NO    Health Care Proxy [x ] YES  [ ] NO      [ ] Surrogate  [x ] HCP  [ ] Guardian:                                                                  Phone#:    Allergies    No Known Allergies    Intolerances      MEDICATIONS  (STANDING):  dextrose 5%. 1000 milliLiter(s) (125 mL/Hr) IV Continuous <Continuous>  dextrose 5%. 1000 milliLiter(s) (50 mL/Hr) IV Continuous <Continuous>  dextrose 50% Injectable 12.5 Gram(s) IV Push once  dextrose 50% Injectable 12.5 Gram(s) IV Push once  dextrose 50% Injectable 25 Gram(s) IV Push once  dextrose 50% Injectable 25 Gram(s) IV Push once  docusate sodium 100 milliGRAM(s) Oral three times a day  heparin  Injectable 5000 Unit(s) SubCutaneous every 8 hours  influenza   Vaccine 0.5 milliLiter(s) IntraMuscular once  insulin glargine Injectable (LANTUS) 12 Unit(s) SubCutaneous at bedtime  insulin lispro (HumaLOG) corrective regimen sliding scale   SubCutaneous at bedtime  insulin lispro (HumaLOG) corrective regimen sliding scale   SubCutaneous three times a day before meals  lactobacillus acidophilus 1 Tablet(s) Oral two times a day with meals  levothyroxine Injectable 37 MICROGram(s) IV Push daily  senna 2 Tablet(s) Oral at bedtime  sodium bicarbonate 650 milliGRAM(s) Oral two times a day  vancomycin  IVPB 500 milliGRAM(s) IV Intermittent every 48 hours    MEDICATIONS  (PRN):  acetaminophen   Tablet. 325 milliGRAM(s) Oral every 6 hours PRN Mild Pain (1 - 3)  acetaminophen   Tablet. 650 milliGRAM(s) Oral every 6 hours PRN Moderate Pain (4 - 6)  bisacodyl Suppository 10 milliGRAM(s) Rectal daily PRN Constipation  dextrose Gel 1 Dose(s) Oral once PRN Blood Glucose LESS THAN 70 milliGRAM(s)/deciliter  glucagon  Injectable 1 milliGRAM(s) IntraMuscular once PRN Glucose LESS THAN 70 milligrams/deciliter  HYDROmorphone  Injectable 0.2 milliGRAM(s) IV Push every 3 hours PRN Mild to severe Pain  levalbuterol Inhalation 1.25 milliGRAM(s) Inhalation two times a day PRN shortness of breath and/or wheezing  LORazepam   Injectable 0.25 milliGRAM(s) IV Push every 6 hours PRN Anxiety  polyethylene glycol 3350 17 Gram(s) Oral two times a day PRN Constipation      PRESENT SYMPTOMS:  Source: [ ] Patient   [x ] Family   [ ] Team     Pain: [ x] YES [ ] NO  OLDCARTS:     Dyspnea: [ ] YES [x ] NO   Anxiety: [ ] YES [x ] NO  Fatigue: [ ] YES [x ] NO   Nausea: [ ] YES [x ] NO  Loss of appetite: [ ] YES [x ] NO   Constipation: [ ] YES [x ] NO     Other Symptoms:  [ ] All other review of systems negative   [ ] Unable to obtain due to poor mentation     Karnofsky Performance Score/Palliative Performance Status Version 2:         %  Protein Calorie Malutrition:  [ ] Mild   [ ] Moderate   [ ] Severe     Vital Signs Last 24 Hrs  T(C): 36.9 (07 Dec 2017 07:00), Max: 36.9 (07 Dec 2017 07:00)  T(F): 98.4 (07 Dec 2017 07:00), Max: 98.4 (07 Dec 2017 07:00)  HR: 73 (07 Dec 2017 07:00) (73 - 80)  BP: 126/75 (07 Dec 2017 07:00) (109/70 - 130/62)  BP(mean): --  RR: 16 (07 Dec 2017 07:00) (16 - 18)  SpO2: 100% (07 Dec 2017 07:00) (98% - 100%)    Physical Exam:    General: [ ] Alert,  A&O x  O   [ ] lethargic   [ ] Agitated   [ ] Cachexia   HEENT: [ ] Normal   [x ] Dry mouth   [ ] ET Tube    [ ] Trach   Lungs: [ ] Clear [ ] Rhonchi  [ ] Crackles [ ] Wheezing [ ] Tachypnea  [ ] Audible excessive secretions   Cardiovascular:  [x ] Regular rate and rhythm  [ ] Irregular [ ] Tachycardia   [ ] Bradycardia   Abdomen: [ x] Soft  [ ] Distended  [ ]  [ ] +BS  [ ] Non tender [ ] Tender  [ ]PEG   [ ] NGT   Last BM:     Genitourinary: [ ] Normal [ ] Incontinent   [ ] Oliguria/Anuria   [ ] Serna  Musculoskeletal:  [ ] Normal   [ ] Generalized weakness  [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   Neurological: [ ] No focal deficits  [ x] Cognitive impairment     Skin: [ ] Normal   [x ] Pressure ulcers     LABS:                        8.0    24.41 )-----------( 166      ( 06 Dec 2017 06:30 )             24.8     12-06    155<H>  |  123<H>  |  79<H>  ----------------------------<  172<H>  3.9   |  17<L>  |  1.88<H>    Ca    8.8      06 Dec 2017 06:30  Phos  4.2     12-06  Mg     2.4     12-06          I&O's Summary    06 Dec 2017 07:01  -  07 Dec 2017 07:00  --------------------------------------------------------  IN: 3000 mL / OUT: 0 mL / NET: 3000 mL    07 Dec 2017 07:01  -  07 Dec 2017 10:52  --------------------------------------------------------  IN: 500 mL / OUT: 0 mL / NET: 500 mL        RADIOLOGY & ADDITIONAL STUDIES:

## 2017-12-07 NOTE — PROGRESS NOTE ADULT - SUBJECTIVE AND OBJECTIVE BOX
Follow Up:      Inverval History/ROS:Patient is a 89y old  Female who presents with a chief complaint of UTI and face swelling (01 Dec 2017 07:19)    Agitates. no events    Allergies    No Known Allergies    Intolerances        ANTIMICROBIALS:  vancomycin  IVPB 500 every 48 hours      OTHER MEDS:  acetaminophen   Tablet. 325 milliGRAM(s) Oral every 6 hours PRN  acetaminophen   Tablet. 650 milliGRAM(s) Oral every 6 hours PRN  bisacodyl Suppository 10 milliGRAM(s) Rectal daily PRN  dextrose 5%. 1000 milliLiter(s) IV Continuous <Continuous>  dextrose 5%. 1000 milliLiter(s) IV Continuous <Continuous>  dextrose 50% Injectable 12.5 Gram(s) IV Push once  dextrose 50% Injectable 12.5 Gram(s) IV Push once  dextrose 50% Injectable 25 Gram(s) IV Push once  dextrose 50% Injectable 25 Gram(s) IV Push once  dextrose Gel 1 Dose(s) Oral once PRN  docusate sodium 100 milliGRAM(s) Oral three times a day  glucagon  Injectable 1 milliGRAM(s) IntraMuscular once PRN  heparin  Injectable 5000 Unit(s) SubCutaneous every 8 hours  HYDROmorphone  Injectable 0.2 milliGRAM(s) IV Push every 3 hours PRN  influenza   Vaccine 0.5 milliLiter(s) IntraMuscular once  insulin glargine Injectable (LANTUS) 12 Unit(s) SubCutaneous at bedtime  insulin lispro (HumaLOG) corrective regimen sliding scale   SubCutaneous at bedtime  insulin lispro (HumaLOG) corrective regimen sliding scale   SubCutaneous three times a day before meals  lactobacillus acidophilus 1 Tablet(s) Oral two times a day with meals  levalbuterol Inhalation 1.25 milliGRAM(s) Inhalation two times a day PRN  levothyroxine Injectable 37 MICROGram(s) IV Push daily  LORazepam   Injectable 0.25 milliGRAM(s) IV Push every 6 hours PRN  polyethylene glycol 3350 17 Gram(s) Oral two times a day PRN  senna 2 Tablet(s) Oral at bedtime  sodium bicarbonate 650 milliGRAM(s) Oral two times a day      Vital Signs Last 24 Hrs  T(C): 36.9 (07 Dec 2017 12:10), Max: 36.9 (07 Dec 2017 07:00)  T(F): 98.5 (07 Dec 2017 12:10), Max: 98.5 (07 Dec 2017 12:10)  HR: 81 (07 Dec 2017 12:10) (73 - 81)  BP: 92/51 (07 Dec 2017 12:10) (92/51 - 130/62)  BP(mean): --  RR: 17 (07 Dec 2017 12:10) (16 - 18)  SpO2: 100% (07 Dec 2017 12:10) (98% - 100%)    PHYSICAL EXAM:  General: [x ] non-toxic  HEAD/EYES: [ ] PERRL [ ] white sclera [ ] icterus  ENT:  [ ] normal [ ] supple [ ] thrush [x ] left parotid induration  Cardiovascular:   [ ] murmur [ ] normal [ ] PPM/AICD  Respiratory:  [ ] clear to ausculation bilaterally  GI:  [x ] soft, non-tender, normal bowel sounds  :  [ ] ewing [ ] no CVA tenderness   Musculoskeletal:  [ ] no synovitis  Neurologic:  [ ] non-focal exam   Skin:  [ x] no rash  Lymph: [ ] no lymphadenopathy  Psychiatric:  [ x]agitated [ ] alert & oriented  Lines:  [x ] no phlebitis [ ] central line                                8.0    24.41 )-----------( 166      ( 06 Dec 2017 06:30 )             24.8       12-06    155<H>  |  123<H>  |  79<H>  ----------------------------<  172<H>  3.9   |  17<L>  |  1.88<H>    Ca    8.8      06 Dec 2017 06:30  Phos  4.2     12-06  Mg     2.4     12-06            MICROBIOLOGY:      RADIOLOGY:  < from: CT Neck Soft Tissue No Cont (12.06.17 @ 16:27) >  IMPRESSION:  Asymmetric enlargement of the left parotid gland with local regional   fatty reticulation and soft tissue swelling consistent with parotitis and   left facialcellulitis. Evaluation for abscess is limited without   contrast administration.    < end of copied text >

## 2017-12-07 NOTE — PROGRESS NOTE ADULT - SUBJECTIVE AND OBJECTIVE BOX
Patient is a 89y old  Female who presents with a chief complaint of UTI and face swelling (01 Dec 2017 07:19)      SUBJECTIVE / OVERNIGHT EVENTS: No overnight events. Pt unable to provide ROS. Daughter and aide thinks L facial edema improving    MEDICATIONS  (STANDING): reviewed  dextrose 5%. 1000 milliLiter(s) (125 mL/Hr) IV Continuous <Continuous>  dextrose 5%. 1000 milliLiter(s) (50 mL/Hr) IV Continuous <Continuous>  dextrose 50% Injectable 12.5 Gram(s) IV Push once  dextrose 50% Injectable 12.5 Gram(s) IV Push once  dextrose 50% Injectable 25 Gram(s) IV Push once  dextrose 50% Injectable 25 Gram(s) IV Push once  docusate sodium 100 milliGRAM(s) Oral three times a day  heparin  Injectable 5000 Unit(s) SubCutaneous every 8 hours  influenza   Vaccine 0.5 milliLiter(s) IntraMuscular once  insulin glargine Injectable (LANTUS) 12 Unit(s) SubCutaneous at bedtime  insulin lispro (HumaLOG) corrective regimen sliding scale   SubCutaneous at bedtime  insulin lispro (HumaLOG) corrective regimen sliding scale   SubCutaneous three times a day before meals  lactobacillus acidophilus 1 Tablet(s) Oral two times a day with meals  levothyroxine Injectable 37 MICROGram(s) IV Push daily  senna 2 Tablet(s) Oral at bedtime  sodium bicarbonate 650 milliGRAM(s) Oral two times a day  vancomycin  IVPB 500 milliGRAM(s) IV Intermittent every 48 hours    MEDICATIONS  (PRN):  acetaminophen   Tablet. 325 milliGRAM(s) Oral every 6 hours PRN Mild Pain (1 - 3)  bisacodyl Suppository 10 milliGRAM(s) Rectal daily PRN Constipation  dextrose Gel 1 Dose(s) Oral once PRN Blood Glucose LESS THAN 70 milliGRAM(s)/deciliter  glucagon  Injectable 1 milliGRAM(s) IntraMuscular once PRN Glucose LESS THAN 70 milligrams/deciliter  HYDROmorphone  Injectable 0.2 milliGRAM(s) IV Push every 3 hours PRN Mild to severe Pain  levalbuterol Inhalation 1.25 milliGRAM(s) Inhalation two times a day PRN shortness of breath and/or wheezing  LORazepam   Injectable 0.25 milliGRAM(s) IV Push every 6 hours PRN Anxiety  polyethylene glycol 3350 17 Gram(s) Oral two times a day PRN Constipation        CAPILLARY BLOOD GLUCOSE      POCT Blood Glucose.: 251 mg/dL (07 Dec 2017 11:57)  POCT Blood Glucose.: 253 mg/dL (07 Dec 2017 08:35)  POCT Blood Glucose.: 262 mg/dL (06 Dec 2017 23:31)  POCT Blood Glucose.: 158 mg/dL (06 Dec 2017 17:34)    I&O's Summary    06 Dec 2017 07:01  -  07 Dec 2017 07:00  --------------------------------------------------------  IN: 3000 mL / OUT: 0 mL / NET: 3000 mL    07 Dec 2017 07:01  -  07 Dec 2017 17:09  --------------------------------------------------------  IN: 1500 mL / OUT: 0 mL / NET: 1500 mL    Vital Signs Last 24 Hrs  T(C): 36.8 (07 Dec 2017 14:24), Max: 36.9 (07 Dec 2017 07:00)  T(F): 98.3 (07 Dec 2017 14:24), Max: 98.5 (07 Dec 2017 12:10)  HR: 78 (07 Dec 2017 16:12) (73 - 81)  BP: 106/58 (07 Dec 2017 16:12) (88/46 - 130/62)  BP(mean): --  RR: 20 (07 Dec 2017 14:24) (16 - 20)  SpO2: 96% (07 Dec 2017 14:24) (96% - 100%)    PHYSICAL EXAM:  GENERAL: No acute distress  HEENT: EOMI, PERRLA, conjunctiva and sclera clear, L parotid edematous skin slightly erythematous  NECK: Supple,   CHEST/LUNG: Clear to auscultation bilaterally  HEART: S1/S2, Regular rate and rhythm; 3/6 SABINE loudest in RUSB  ABDOMEN: Distended, NABS, soft,    EXTREMITIES:  2+ Peripheral Pulses, no clubbing, cyanosis, or edema  NEUROLOGY: no strength, bed bound, contracted, unable to open eyes, follow commands.   SKIN: No rashes or lesions    LABS:             12/7 labs pending               8.0    24.41 )-----------( 166      ( 06 Dec 2017 06:30 )             24.8     12-06    155<H>  |  123<H>  |  79<H>  ----------------------------<  172<H>  3.9   |  17<L>  |  1.88<H>    Ca    8.8      06 Dec 2017 06:30  Phos  4.2     12-06  Mg     2.4     12-06      RADIOLOGY & ADDITIONAL TESTS:    Imaging Personally Reviewed: CT neck 12/6/17 < from: CT Neck Soft Tissue No Cont (12.06.17 @ 16:27) >  fatty reticulation and soft tissue swelling consistent with parotitis and   left facial cellulitis. Evaluation for abscess is limited without   contrast administration.    < end of copied text >      Consultant(s) Notes Reviewed:      Care Discussed with Consultants/Other Providers:

## 2017-12-07 NOTE — PROGRESS NOTE ADULT - ATTENDING COMMENTS
89y Female w/ upper airway swelling concerning for potential airway compromise, secondary to parotitis and , also with UTI    PLAN:   Neurologic: Continue with namenda and citalopram.  Avoid analgesics with the potential to cause respiratory depression  Respiratory: Continue to monitor on face tent, humidified air. Low threshold for intubation if airway status declines.   Cardiovascular: No active issues, will continue to monitor  Gastrointestinal/Nutrition: NPO for now in setting of possible need for intubation.   Renal/Genitourinary: Continue with Levaquin to treat UTI  Hematologic: SQH for DVT ppx  Infectious Disease: Leukocytosis likely 2/2 UTI and parotitis. Continue with unasyn.    Lines/Tubes: PIV  Endocrine: Continue with home synthroid dose  Disposition: SICU    CCDx;  stridor, respiratory abnormality, dementia, parotiditis.    The patient is a critical care patient with hemodynamic and metabolic instability in SICU.  I have personally interviewed and examined this patient, reviewed labs and x-rays, discussed with other consultants, House staff and PA's.  I spent   45  minutes  in total providing critical care for the diagnoses, assessment and plan above.  These diagnoses are unrelated to the surgical procedure noted above.  I met with family     min to get further history and make care decisions for this patient who is unable to participate due to altered mental status.  Time involved in performance of separately billable procedures was not counted toward my critical care time.  There is no overlap.
Pt seen and examined  Dysphagia diet  Consider resumption of home medication  Lengthy discussion with the family, HCP Ros  We discussed the importance of a MOLST and the utility of readdressing these goals  She said that her goals have not changed on this form for the patient.  Initially, she agreed to have another discussion but then after our departure spoke with the Pal SW and was irritated at the possibility of meeting to address goals which have not changed.  Since the goals are clearly delineated, we will remove the patient from the palliative care consult list.  Thank you for the consult and please feel free to reconsult when clinical needs arise

## 2017-12-07 NOTE — PROGRESS NOTE ADULT - ASSESSMENT
89 year old female with CKD, dementia presents with fever and facial swelling.    Exam and imaging consistent with parotitis/ siloadenitis as focus of MRSA bacteremia    Repeat blood cultures are without growth.    Check TTE- risk of BARON likely outweight benefits     Continue Vancmycin 500i iv q 48 through 12/29      Weekly CBC, BMP, vanco through    No ID objection to picc    Leukocytosis is likely multifactorial.  No draianbel collection on CT    Overall prognosis poor due to her comorbidities.

## 2017-12-07 NOTE — PROGRESS NOTE ADULT - PROBLEM SELECTOR PLAN 10
Pt has advanced dementia and poor quality of life- bedbound, non-verbal with pressure ulcer  She remains full code  Pall care consult appreciated and case d/w Dr. Woo re: ELBA review with pt's daughter

## 2017-12-07 NOTE — PROGRESS NOTE ADULT - ASSESSMENT
89 yo woman PM H CKD stage 4, advanced dementia (non verbal and bed bound), and recurrent UTIs presents with L cheek swelling consistent with parotitis.  Course complicated by MRSA bacteremia on IV vancomycin.

## 2017-12-07 NOTE — CHART NOTE - NSCHARTNOTEFT_GEN_A_CORE
Was called by an RN to speak to Pt's daughter who was very upset as to an issue of miscommunication that transpired when Pt went down for PICC Line placement. Spoke with Pt's daughter Faith Esparza, she stated that prior to consenting for the PICC she asked a lot of questions, Pt specifically asked the consenting provider (name unknown) if the PICC line would be anywhere close to her mother's heart and she was told it would not. She is now aware that it is. I clarified with her that placement does extends to the superior vena cava. Pt's daughter states that she would have not consented to the PICC line placement if she knew that it would be anywhere near her mother's heart. Pt's daughter is concern for potential effects, risks to the heart, due to the location of the PICC line. It was explained to the daughter that most patient have no issues with the PICC if managed appropriately. Pt's daughter requested to speak to someone from the department who perform such placement. Spoke with IR Fellow Francisco Javier Perez, who will have someone from the IR team meet with the Pt's daughter tomorrow to further discuss and address the miscommunication.

## 2017-12-07 NOTE — PROGRESS NOTE ADULT - PROBLEM SELECTOR PLAN 2
- Bcx cleared since 12/1  - ID following, vanco dosing based on trough levels.    - Vanc 750mg q48hr. Monitor trough  - TTE pending to r/o endocarditis.  - Plan for PICC line today with plan to obtain labs after PICC

## 2017-12-07 NOTE — PROGRESS NOTE ADULT - PROBLEM SELECTOR PLAN 1
- ENT recs appreciated. Continue IV abx as per ID and parotid gland massage (3-4x daily), family and home aide continues to do this  - Pt no longer on steroids   - Warm compresses to area   - Pain control PRN  - CT neck reviewed- difficult to assess improvement without contrast, no drainable collection noted  - No surgical intervention planned at this time  - Case d/w ID Dr. Blanchard

## 2017-12-07 NOTE — PROGRESS NOTE ADULT - ASSESSMENT
Patient is a 89yo female with history of endstage dementia, CKD, hypothyroidism, breast cancer , resides with daughter, Caron, both daughters, Caron & Diamond Thomas are the hcp, (as per the molst form), patient was admitted for decreased responsiveness, fever,  left sided facial swelling with parotitis.  She has being treated with IV Vancomycin for MRSA bacteremia. She passed barium swallow test yesterday. She did not receive any prn medications overnight, as her wbc is trending up, repeat CT neck.  Possible family meeting if daughters are in agreement to discuss any further interventions.

## 2017-12-08 LAB
BASOPHILS # BLD AUTO: 0.02 K/UL — SIGNIFICANT CHANGE UP (ref 0–0.2)
BASOPHILS NFR BLD AUTO: 0.1 % — SIGNIFICANT CHANGE UP (ref 0–2)
BUN SERPL-MCNC: 77 MG/DL — HIGH (ref 7–23)
CALCIUM SERPL-MCNC: 7.6 MG/DL — LOW (ref 8.4–10.5)
CHLORIDE SERPL-SCNC: 105 MMOL/L — SIGNIFICANT CHANGE UP (ref 98–107)
CO2 SERPL-SCNC: 18 MMOL/L — LOW (ref 22–31)
CREAT SERPL-MCNC: 2.01 MG/DL — HIGH (ref 0.5–1.3)
EOSINOPHIL # BLD AUTO: 0.15 K/UL — SIGNIFICANT CHANGE UP (ref 0–0.5)
EOSINOPHIL NFR BLD AUTO: 0.8 % — SIGNIFICANT CHANGE UP (ref 0–6)
GLUCOSE SERPL-MCNC: 208 MG/DL — HIGH (ref 70–99)
HCT VFR BLD CALC: 22.5 % — LOW (ref 34.5–45)
HGB BLD-MCNC: 7.2 G/DL — LOW (ref 11.5–15.5)
IMM GRANULOCYTES # BLD AUTO: 0.13 # — SIGNIFICANT CHANGE UP
IMM GRANULOCYTES NFR BLD AUTO: 0.7 % — SIGNIFICANT CHANGE UP (ref 0–1.5)
LYMPHOCYTES # BLD AUTO: 1.64 K/UL — SIGNIFICANT CHANGE UP (ref 1–3.3)
LYMPHOCYTES # BLD AUTO: 9 % — LOW (ref 13–44)
MCHC RBC-ENTMCNC: 30.9 PG — SIGNIFICANT CHANGE UP (ref 27–34)
MCHC RBC-ENTMCNC: 32 % — SIGNIFICANT CHANGE UP (ref 32–36)
MCV RBC AUTO: 96.6 FL — SIGNIFICANT CHANGE UP (ref 80–100)
MONOCYTES # BLD AUTO: 0.74 K/UL — SIGNIFICANT CHANGE UP (ref 0–0.9)
MONOCYTES NFR BLD AUTO: 4.1 % — SIGNIFICANT CHANGE UP (ref 2–14)
NEUTROPHILS # BLD AUTO: 15.51 K/UL — HIGH (ref 1.8–7.4)
NEUTROPHILS NFR BLD AUTO: 85.3 % — HIGH (ref 43–77)
NRBC # FLD: 0 — SIGNIFICANT CHANGE UP
PLATELET # BLD AUTO: 154 K/UL — SIGNIFICANT CHANGE UP (ref 150–400)
PMV BLD: 14.3 FL — HIGH (ref 7–13)
POTASSIUM SERPL-MCNC: 3.4 MMOL/L — LOW (ref 3.5–5.3)
POTASSIUM SERPL-SCNC: 3.4 MMOL/L — LOW (ref 3.5–5.3)
RBC # BLD: 2.33 M/UL — LOW (ref 3.8–5.2)
RBC # FLD: 14.3 % — SIGNIFICANT CHANGE UP (ref 10.3–14.5)
SODIUM SERPL-SCNC: 138 MMOL/L — SIGNIFICANT CHANGE UP (ref 135–145)
WBC # BLD: 18.19 K/UL — HIGH (ref 3.8–10.5)
WBC # FLD AUTO: 18.19 K/UL — HIGH (ref 3.8–10.5)

## 2017-12-08 PROCEDURE — 99232 SBSQ HOSP IP/OBS MODERATE 35: CPT

## 2017-12-08 PROCEDURE — 99233 SBSQ HOSP IP/OBS HIGH 50: CPT

## 2017-12-08 RX ORDER — INSULIN GLARGINE 100 [IU]/ML
9 INJECTION, SOLUTION SUBCUTANEOUS AT BEDTIME
Qty: 0 | Refills: 0 | Status: DISCONTINUED | OUTPATIENT
Start: 2017-12-08 | End: 2017-12-09

## 2017-12-08 RX ORDER — SODIUM CHLORIDE 9 MG/ML
10 INJECTION INTRAMUSCULAR; INTRAVENOUS; SUBCUTANEOUS
Qty: 24 | Refills: 0 | OUTPATIENT
Start: 2017-12-08 | End: 2017-12-18

## 2017-12-08 RX ORDER — POTASSIUM CHLORIDE 20 MEQ
20 PACKET (EA) ORAL ONCE
Qty: 0 | Refills: 0 | Status: COMPLETED | OUTPATIENT
Start: 2017-12-08 | End: 2017-12-08

## 2017-12-08 RX ORDER — SODIUM CHLORIDE 9 MG/ML
1000 INJECTION, SOLUTION INTRAVENOUS
Qty: 0 | Refills: 0 | Status: DISCONTINUED | OUTPATIENT
Start: 2017-12-08 | End: 2017-12-08

## 2017-12-08 RX ORDER — VANCOMYCIN HCL 1 G
500 VIAL (EA) INTRAVENOUS
Qty: 10 | Refills: 0 | OUTPATIENT
Start: 2017-12-08 | End: 2017-12-18

## 2017-12-08 RX ADMIN — Medication 37 MICROGRAM(S): at 07:17

## 2017-12-08 RX ADMIN — Medication 1 TABLET(S): at 17:32

## 2017-12-08 RX ADMIN — Medication 1 TABLET(S): at 07:17

## 2017-12-08 RX ADMIN — Medication 100 MILLIGRAM(S): at 14:48

## 2017-12-08 RX ADMIN — Medication 2: at 09:27

## 2017-12-08 RX ADMIN — SODIUM CHLORIDE 75 MILLILITER(S): 9 INJECTION, SOLUTION INTRAVENOUS at 11:17

## 2017-12-08 RX ADMIN — Medication 2: at 12:53

## 2017-12-08 RX ADMIN — Medication 20 MILLIEQUIVALENT(S): at 11:16

## 2017-12-08 RX ADMIN — Medication 100 MILLIGRAM(S): at 17:30

## 2017-12-08 RX ADMIN — HEPARIN SODIUM 5000 UNIT(S): 5000 INJECTION INTRAVENOUS; SUBCUTANEOUS at 14:49

## 2017-12-08 RX ADMIN — Medication 650 MILLIGRAM(S): at 07:17

## 2017-12-08 RX ADMIN — Medication 650 MILLIGRAM(S): at 17:31

## 2017-12-08 RX ADMIN — HEPARIN SODIUM 5000 UNIT(S): 5000 INJECTION INTRAVENOUS; SUBCUTANEOUS at 07:16

## 2017-12-08 RX ADMIN — Medication: at 18:25

## 2017-12-08 NOTE — PROGRESS NOTE ADULT - PROBLEM SELECTOR PLAN 2
- Bcx cleared since 12/1  - If hypotensive again, plan to repeat cxs.  - ID following, vanco dosing based on trough levels.    - Vanc 750mg q48hr. Monitor trough  - TTE pending to r/o endocarditis.  - Pt s/p PICC line for prolonged abx but daughter now contemplating removal since it is near her mother's heart

## 2017-12-08 NOTE — PROGRESS NOTE ADULT - SUBJECTIVE AND OBJECTIVE BOX
Follow Up:      Inverval History/ROS:Patient is a 89y old  Female who presents with a chief complaint of UTI and face swelling (01 Dec 2017 07:19)    Agitated, no events    Allergies    No Known Allergies    Intolerances        ANTIMICROBIALS:  vancomycin  IVPB 500 every 48 hours      OTHER MEDS:  acetaminophen   Tablet. 325 milliGRAM(s) Oral every 6 hours PRN  bisacodyl Suppository 10 milliGRAM(s) Rectal daily PRN  dextrose 5%. 1000 milliLiter(s) IV Continuous <Continuous>  dextrose 50% Injectable 12.5 Gram(s) IV Push once  dextrose 50% Injectable 12.5 Gram(s) IV Push once  dextrose 50% Injectable 25 Gram(s) IV Push once  dextrose 50% Injectable 25 Gram(s) IV Push once  dextrose Gel 1 Dose(s) Oral once PRN  docusate sodium 100 milliGRAM(s) Oral three times a day  glucagon  Injectable 1 milliGRAM(s) IntraMuscular once PRN  heparin  Injectable 5000 Unit(s) SubCutaneous every 8 hours  HYDROmorphone  Injectable 0.2 milliGRAM(s) IV Push every 3 hours PRN  influenza   Vaccine 0.5 milliLiter(s) IntraMuscular once  insulin glargine Injectable (LANTUS) 9 Unit(s) SubCutaneous at bedtime  insulin lispro (HumaLOG) corrective regimen sliding scale   SubCutaneous at bedtime  insulin lispro (HumaLOG) corrective regimen sliding scale   SubCutaneous three times a day before meals  lactobacillus acidophilus 1 Tablet(s) Oral two times a day with meals  levalbuterol Inhalation 1.25 milliGRAM(s) Inhalation two times a day PRN  levothyroxine Injectable 37 MICROGram(s) IV Push daily  LORazepam   Injectable 0.25 milliGRAM(s) IV Push every 6 hours PRN  polyethylene glycol 3350 17 Gram(s) Oral two times a day PRN  senna 2 Tablet(s) Oral at bedtime  sodium bicarbonate 650 milliGRAM(s) Oral two times a day      Vital Signs Last 24 Hrs  T(C): 36.8 (08 Dec 2017 09:56), Max: 36.8 (08 Dec 2017 09:56)  T(F): 98.3 (08 Dec 2017 09:56), Max: 98.3 (08 Dec 2017 09:56)  HR: 84 (08 Dec 2017 09:56) (77 - 84)  BP: 131/83 (08 Dec 2017 09:56) (92/58 - 131/83)  BP(mean): --  RR: 20 (08 Dec 2017 09:56) (19 - 20)  SpO2: 97% (08 Dec 2017 09:56) (96% - 98%)    PHYSICAL EXAM:  General: [ ] non-toxic  HEAD/EYES: [ ] PERRL [x ] white sclera [ ] icterus  ENT:  [ ] normal [x ] supple [ ] thrush [ ] pharyngeal exudate  Cardiovascular:   [ ] murmur [x ] normal [ ] PPM/AICD  Respiratory:  [ x] clear to ausculation bilaterally  GI:  [ x] soft, non-tender, normal bowel sounds  :  [ ] ewing [ ] no CVA tenderness   Musculoskeletal:  [ ] no synovitis  Neurologic:  [ ] non-focal exam   Skin:  [ x] no rash  Lymph: [ ] no lymphadenopathy  Psychiatric:  [ ] appropriate affect [x ] agiotated  Lines:  [ x] no phlebitis [ ] central line                                7.2    18.19 )-----------( 154      ( 08 Dec 2017 06:30 )             22.5       12-08    138  |  105  |  77<H>  ----------------------------<  208<H>  3.4<L>   |  18<L>  |  2.01<H>    Ca    7.6<L>      08 Dec 2017 06:30            MICROBIOLOGY:    RADIOLOGY:

## 2017-12-08 NOTE — PROGRESS NOTE ADULT - PROBLEM SELECTOR PLAN 9
Pt is bedbound and dependent for all ADLs  Protective skin care and frequent turns  Wound care to sacral decub
Pt is bedbound and dependent for all ADLs  Protective skin care and frequent turns  Wound care to sacral decub pressure ulcer. Wound care recs appreciated
Pt is bedbound and dependent for all ADLs  Protective skin care and frequent turns  Wound care to sacral decub pressure ulcer. Wound care recs appreciated

## 2017-12-08 NOTE — PROGRESS NOTE ADULT - PROBLEM SELECTOR PROBLEM 8
Constipation, unspecified constipation type

## 2017-12-08 NOTE — PROGRESS NOTE ADULT - PROBLEM SELECTOR PROBLEM 4
Hypernatremia
Constipation, unspecified constipation type
Hypernatremia

## 2017-12-08 NOTE — PROGRESS NOTE ADULT - PROBLEM SELECTOR PLAN 3
- Stabilizing Creatinine, improved Cr since admission (Cr 3.08), however still above baseline.    - Pt is not a HD candidate at this time  - Continue with renal restricted diet  - Renally dose all medications  - Continue sodium bicarb for chronic acidosis
As her WBC is trending up, noncontrast CT neck will be repeated.
- Stabilizing Creatinine, improved Cr since admission (Cr 3.08), however still above baseline.    - Pt is not a HD candidate at this time  - Continue with renal restricted diet  - Renally dose all medications  - Continue sodium bicarb for chronic acidosis
- Stabilizing Creatinine, improved Cr since admission (Cr 3.08), however still above baseline.    - Pt is not a HD candidate at this time  - Continue with renal restricted diet  - Renally dose all medications  - Continue sodium bicarb for chronic acidosis
- Stabilizing Creatinine, improved Cr since admission (Cr 3.08), however still above baseline.    - Pt is not a HD candidate at this time  - Continue with renal restricted diet  - Renally dose all medications.  Ensure switched to Nepro
- Stabilizing Creatinine, improved Cr since admission (Cr 3.08), however still above baseline.    - Pt is not a HD candidate at this time  - Continue with renal restricted diet, consider adding phoslo  - Renally dose all medications.  Ensure switched to Nepro
- Stabilizing Creatinine, improved Cr since admission (Cr 3.08), however still above baseline.    - Pt is not a HD candidate at this time  - Continue with renal restricted diet  - Renally dose all medications  - Continue sodium bicarb for chronic acidosis

## 2017-12-08 NOTE — PROGRESS NOTE ADULT - PROBLEM SELECTOR PLAN 4
- Likely 2/2 to poor access to free water, Improving w/ D5W  - Continue D5W  - Failed speech and swallow eval. Cine esophagram noted- diet changed
Continue bowel regimen, Senna Q6h, PRN.
- LIkely 2/2 to poor access to free water  - Will switch fluids to D5 1/3 NS.
- LIkely 2/2 to poor access to free water  - Will switch fluids to D5W and monitor FG  - F/u pm BMP  - F/u speech and swallow eval
- Likely 2/2 to poor access to free water, Improving w/ D5W  - Continue D5W  - Failed speech and swallow eval. F/u cine esophagram
- Resolved  - D/c D5W  - Pt tolerating dysphagia diet  - Monitor Na and encourage thickened water
- Likely 2/2 to poor access to free water, Improving w/ D5W  - Continue D5W  - Failed speech and swallow eval. Cine esophagram noted- diet changed

## 2017-12-08 NOTE — PROGRESS NOTE ADULT - PROBLEM SELECTOR PLAN 7
- Ensure switched to Nepro as per daughter's request  - Encourage thickened water
- Ensure switched to Nepro as per daughter's request
- Ensure switched to Nepro as per daughter's request  - Encourage thickened water
- Ensure switched to Nepro as per daughter's request  - Pt failed speech and swallow eval. Cine esophagram pending
- Ensure switched to Nepro as per daughter's request  - Speech and swallow eval pe nding. Will f/u for dietary recs
- Ensure switched to Nepro as per daughter's request  - Encourage thickened water

## 2017-12-08 NOTE — PROGRESS NOTE ADULT - PROBLEM SELECTOR PROBLEM 5
Hyperglycemia, drug-induced
Palliative care encounter
Hyperglycemia, drug-induced

## 2017-12-08 NOTE — PROGRESS NOTE ADULT - ASSESSMENT
89 yo woman PM H CKD stage 4, advanced dementia (non verbal and bed bound), and recurrent UTIs presents with L cheek swelling consistent with parotitis.  Course complicated by MRSA bacteremia on IV vancomycin.

## 2017-12-08 NOTE — PROGRESS NOTE ADULT - PROBLEM SELECTOR PLAN 5
- Likely steroid induced and dextrose solution also now contributing  - Continue Lantus QHS, dose increased  - Monitor FG closely with D5W  - Encourage thickened water
Ativan 0.25mg IV Q6h, PRN for anxiety  Dilaudid 0.2mg IV Q3h, PRN for pain management   Wound care to sacral decubitus with frequent repositioning
- Likely steroid induced  - Continue Lantus QHS for improved   - Pt IVF switched to D5W to assist with hypernatremia so will need to adjust insulin accordingly
- Likely steroid induced  - Continue Lantus QHS, dose increased  - Monitor FG closely with D5W
- Likely steroid induced  - Will add low dose Lantus QHS for improved glycemic control  - Pt will be started on gentle hypotonic IVF with dextrose to assist with hypernatremia
- Likely steroid induced and dextrose solution also now contributing  - Continue Lantus QHS, can likely decrease dose now planning to d/c IVF  - Monitor FG closely with D5W  - Encourage thickened water
- Likely steroid induced and dextrose solution also now contributing  - Continue Lantus QHS, dose increased  - Monitor FG closely with D5W  - Encourage thickened water

## 2017-12-08 NOTE — PROGRESS NOTE ADULT - ASSESSMENT
89 year old female with CKD, dementia presents with fever and facial swelling.    Exam and imaging consistent with parotitis/ siloadenitis as focus of MRSA bacteremia    Repeat blood cultures are without growth.    Check TTE- risk of BARON likely outweight benefits     Continue Vancmycin 500i iv q 48 through 12/29    Weekly CBC, BMP, vanco through    No ID objection to picc    Leukocytosis is likely multifactorial.  No draianbel collection on CT    Overall prognosis poor due to her comorbidities.     Call the ID service with questions or concerns over the weekend.  [phone]

## 2017-12-08 NOTE — PROGRESS NOTE ADULT - PROBLEM SELECTOR PLAN 1
- ENT recs appreciated. Continue IV abx as per ID and parotid gland massage (3-4x daily), family and home aide continues to do this  - Pt no longer on steroids   - Warm compresses to area   - Pain control PRN  - CT neck reviewed- difficult to assess improvement without contrast, no drainable collection noted  - No surgical intervention planned at this time  - Continue IV abx

## 2017-12-08 NOTE — PROGRESS NOTE ADULT - PROBLEM SELECTOR PROBLEM 3
Stage 4 chronic kidney disease
Parotitis, acute
Stage 4 chronic kidney disease

## 2017-12-08 NOTE — PROGRESS NOTE ADULT - PROBLEM SELECTOR PLAN 10
Pt has advanced dementia and poor quality of life- bedbound, non-verbal with pressure ulcer  She remains full code  Pall care consult appreciated and case d/w Dr. Woo re: ELBA review with pt's daughter Pt has advanced dementia and poor quality of life- bedbound, non-verbal with pressure ulcer  She remains full code  Pall care consult appreciated and case d/w Dr. Woo re: ELBA

## 2017-12-08 NOTE — PROGRESS NOTE ADULT - PROBLEM SELECTOR PROBLEM 1
Dementia without behavioral disturbance, unspecified dementia type
Parotitis, acute

## 2017-12-08 NOTE — PROGRESS NOTE ADULT - PROBLEM SELECTOR PLAN 6
- Likely 2/2 CKD/uremia   - Pt not a candidate for HD  - Continue bicarbonate supplement
- Likely 2/2 CKD/uremia   - Pt not a candidate for HD  - Consider adding bicarbonate supplement to IVF if HCO3 levels continue to drop, however this is all supportive care and ultimately will not reverse her severe CKD
- Likely 2/2 CKD/uremia   - Pt not a candidate for HD  - Continue bicarbonate supplement
Time spent 30 min
- Likely 2/2 CKD/uremia   - Pt not a candidate for HD  - Continue bicarbonate supplement

## 2017-12-08 NOTE — PROGRESS NOTE ADULT - PROBLEM SELECTOR PROBLEM 2
Stage 4 chronic kidney disease
MRSA bacteremia

## 2017-12-08 NOTE — PROGRESS NOTE ADULT - PROVIDER SPECIALTY LIST ADULT
ENT
Hospitalist
Infectious Disease
SICU
ENT
ENT
Infectious Disease
Palliative Care
Hospitalist

## 2017-12-08 NOTE — PROGRESS NOTE ADULT - PROBLEM SELECTOR PROBLEM 9
Functional quadriplegia

## 2017-12-08 NOTE — PROGRESS NOTE ADULT - PROBLEM SELECTOR PROBLEM 7
Severe protein-calorie malnutrition

## 2017-12-08 NOTE — PROGRESS NOTE ADULT - PROBLEM SELECTOR PLAN 8
Continue bowel regimen
Suppository added today  Will monitor for BM
Continue bowel regimen

## 2017-12-08 NOTE — CHART NOTE - NSCHARTNOTEFT_GEN_A_CORE
Spoke with Dr. Garcia and he will send a PA to speak with patients daughter. Who has concerns about the PICC line  placement ( being close to heart). Would like to discuss potential complications.

## 2017-12-08 NOTE — PROGRESS NOTE ADULT - SUBJECTIVE AND OBJECTIVE BOX
Patient is a 89y old  Female who presents with a chief complaint of UTI and face swelling (01 Dec 2017 07:19)      SUBJECTIVE / OVERNIGHT EVENTS: Pt hypotensive yesterday improved w/ IVF. She is now s/p PICC line yesterday. Family concerned about potential risks associated w/ PICC after consenting for PICC and multiple providers explaining the procedure. No bleeding reported. Pt had BM this am. Eating well as per RN      Pt unable to provide ROS    MEDICATIONS  (STANDING): reviewed  dextrose 5%. 1000 milliLiter(s) (75 mL/Hr) IV Continuous <Continuous>  dextrose 5%. 1000 milliLiter(s) (50 mL/Hr) IV Continuous <Continuous>  dextrose 50% Injectable 12.5 Gram(s) IV Push once  dextrose 50% Injectable 12.5 Gram(s) IV Push once  dextrose 50% Injectable 25 Gram(s) IV Push once  dextrose 50% Injectable 25 Gram(s) IV Push once  docusate sodium 100 milliGRAM(s) Oral three times a day  heparin  Injectable 5000 Unit(s) SubCutaneous every 8 hours  influenza   Vaccine 0.5 milliLiter(s) IntraMuscular once  insulin glargine Injectable (LANTUS) 12 Unit(s) SubCutaneous at bedtime  insulin lispro (HumaLOG) corrective regimen sliding scale   SubCutaneous at bedtime  insulin lispro (HumaLOG) corrective regimen sliding scale   SubCutaneous three times a day before meals  lactobacillus acidophilus 1 Tablet(s) Oral two times a day with meals  levothyroxine Injectable 37 MICROGram(s) IV Push daily  senna 2 Tablet(s) Oral at bedtime  sodium bicarbonate 650 milliGRAM(s) Oral two times a day  vancomycin  IVPB 500 milliGRAM(s) IV Intermittent every 48 hours    MEDICATIONS  (PRN):  acetaminophen   Tablet. 325 milliGRAM(s) Oral every 6 hours PRN Mild Pain (1 - 3)  bisacodyl Suppository 10 milliGRAM(s) Rectal daily PRN Constipation  dextrose Gel 1 Dose(s) Oral once PRN Blood Glucose LESS THAN 70 milliGRAM(s)/deciliter  glucagon  Injectable 1 milliGRAM(s) IntraMuscular once PRN Glucose LESS THAN 70 milligrams/deciliter  HYDROmorphone  Injectable 0.2 milliGRAM(s) IV Push every 3 hours PRN Mild to severe Pain  levalbuterol Inhalation 1.25 milliGRAM(s) Inhalation two times a day PRN shortness of breath and/or wheezing  LORazepam   Injectable 0.25 milliGRAM(s) IV Push every 6 hours PRN Anxiety  polyethylene glycol 3350 17 Gram(s) Oral two times a day PRN Constipation        CAPILLARY BLOOD GLUCOSE      POCT Blood Glucose.: 230 mg/dL (08 Dec 2017 12:28)  POCT Blood Glucose.: 213 mg/dL (08 Dec 2017 08:59)  POCT Blood Glucose.: 261 mg/dL (07 Dec 2017 22:48)  POCT Blood Glucose.: 271 mg/dL (07 Dec 2017 18:39)    I&O's Summary    07 Dec 2017 07:01  -  08 Dec 2017 07:00  --------------------------------------------------------  IN: 1875 mL / OUT: 0 mL / NET: 1875 mL    Vital Signs Last 24 Hrs  T(C): 36.8 (08 Dec 2017 09:56), Max: 36.8 (08 Dec 2017 09:56)  T(F): 98.3 (08 Dec 2017 09:56), Max: 98.3 (08 Dec 2017 09:56)  HR: 84 (08 Dec 2017 09:56) (77 - 84)  BP: 131/83 (08 Dec 2017 09:56) (92/58 - 131/83)  BP(mean): --  RR: 20 (08 Dec 2017 09:56) (19 - 20)  SpO2: 97% (08 Dec 2017 09:56) (96% - 98%)      PHYSICAL EXAM: pt unable to participate in exam  GENERAL:  Thin, No acute distress  HEENT:  eyes closed, L parotid swelling w/ erythema on skin, unable to examine mouth  NECK: Supple  CHEST/LUNG: Clear to auscultation bilaterally; no wheezes, rhonchi, or rales  HEART: S1/S2, Regular rate and rhythm  ABDOMEN: Nondistended, NABS, soft,   EXTREMITIES: no clubbing, cyanosis, or edema  PSYCH: AAOx0  NEUROLOGY: no strength  SKIN: sacral decub dressing c/d/i    LABS: reviewed    decrease in Hgb /Hct noted                        7.2    18.19 )-----------( 154      ( 08 Dec 2017 06:30 )             22.5     12-08    138  |  105  |  77<H>  ----------------------------<  208<H>  3.4<L>   |  18<L>  |  2.01<H>    Ca    7.6<L>      08 Dec 2017 06:30      RADIOLOGY & ADDITIONAL TESTS:    Imaging Personally Reviewed:    Consultant(s) Notes Reviewed:      Care Discussed with Consultants/Other Providers:

## 2017-12-08 NOTE — PROGRESS NOTE ADULT - PROBLEM SELECTOR PROBLEM 6
Metabolic acidosis
Advance care planning
Metabolic acidosis

## 2017-12-09 VITALS
DIASTOLIC BLOOD PRESSURE: 54 MMHG | TEMPERATURE: 97 F | SYSTOLIC BLOOD PRESSURE: 100 MMHG | HEART RATE: 104 BPM | OXYGEN SATURATION: 95 % | RESPIRATION RATE: 17 BRPM

## 2017-12-09 RX ADMIN — HEPARIN SODIUM 5000 UNIT(S): 5000 INJECTION INTRAVENOUS; SUBCUTANEOUS at 00:05

## 2017-12-09 RX ADMIN — INSULIN GLARGINE 9 UNIT(S): 100 INJECTION, SOLUTION SUBCUTANEOUS at 00:04

## 2017-12-09 RX ADMIN — Medication 1: at 00:04

## 2017-12-09 RX ADMIN — SENNA PLUS 2 TABLET(S): 8.6 TABLET ORAL at 00:05

## 2017-12-09 RX ADMIN — Medication 100 MILLIGRAM(S): at 00:05

## 2017-12-09 NOTE — CHART NOTE - NSCHARTNOTEFT_GEN_A_CORE
ADS NIGHT COVERAGE:    : Notified by RN patient is diaphoretic and cold.  Pt seen and evaluated at bedside.  Pt is non-verbal and non-responsive per baseline.  B/L radial, carotid, femoral pulses absent.  No rise of chest noted.  Code blue called. CPR was started and 6 rounds of epinephrine was given , 1x dose of calcium carbonate and 1x dose of Amiodarone given with no response.  Pt intubated by anesthesia.  Pt was pronounced  at 0312.  Health care proxy, Caron (Daughter) notified and would not like an autopsy performed.  Cause of death likely multi-organ failure caused by bacteremia .  A-team hospitalist notified as well    Luz NASH  [id number]

## 2017-12-09 NOTE — PROVIDER CONTACT NOTE (OTHER) - SITUATION
Upon auscultation, lungs had increased rhonchi and pt's aide at bedside expressed increased concern for work of breathing.
Pt has new KFT. Tube is not flushing.
Pt's family concerned that pt will not be receiving tube feeds tonight due to malfunctioning KFT.
hr 104 /54 diminished respirations
rapid response called, and then code blue

## 2017-12-09 NOTE — CONSULT NOTE ADULT - CONSULT REASON
Code Blue
Impending airway compromise
MRSA Bacteremia
To assist the health care team with the ethical dilemma presented in the care of a 89y/o  with underlying significant dementia should her medical condition deteriorate.
Transfer to Medicine?
face swelling
airway evaluation
Palliative team is consulted for pain management.

## 2017-12-09 NOTE — CONSULT NOTE ADULT - SUBJECTIVE AND OBJECTIVE BOX
RICO Agustin Responded to code blue pager. Upon arrival, ACLS underway with ambu bag ventilation. ETT passed with glide scope x2 attempts. 7.5 tube. Secured at lip at 20. EtCO2 confirmed via color change and bilateral and equal breath sounds.

## 2017-12-09 NOTE — CHART NOTE - NSCHARTNOTEFT_GEN_A_CORE
RRT then Code Blue called prior to arrival. CPR initiated at 2:51AM. Pulseless VT on monitor at initial pulse check. Patient shocked x2. Subsequently asystole for remainder of coded. Received total of Epi x6, Calcium x1, Amiodarone 300mg IV x1. CPR stopped and patient declared at 3:12AM. Unable to reach family throughout code. Primary team to continue attempts to reach family.     Marcin Hills PGY 3  [id number] RRT then Code Blue called prior to arrival. CPR initiated at 2:51AM. Pulseless VT on monitor at initial pulse check. Patient shocked x2. Subsequently asystole for remainder of code. Copious amount of dark black vomitus noted during code. Received total of Epi x6, Calcium x1, Amiodarone 300mg IV x1. CPR stopped and patient declared at 3:12AM. Unable to reach family throughout code. Primary team to continue attempts to reach family.     Marcin Hills PGY 3  [id number]

## 2017-12-09 NOTE — PROVIDER CONTACT NOTE (OTHER) - RECOMMENDATIONS
Chest PT and possible respiratory treatment.
MD notified. Repeat xray for KFT placement. MD to come assess pt.
Repeat xray
notify MD, recheck vitals. draw bloods, draw sugar levels
rapid response called

## 2017-12-09 NOTE — PROVIDER CONTACT NOTE (OTHER) - ASSESSMENT
Patient ordered for AM CBC and BMP. PCA attempted twice, RN attempted twice as well. All attempts were unsuccessful. MD made aware
Upon auscultation pt had increased rhonchi from previous assessment. Pt uses accessory muscles for work of breathing, respiration pattern irregular, RR 16-20. Pt O2 saturation is 100% on face tent 6L 35%. Team notified of assessment.
Kussmaul's respirations, could not feel pulse, Low blood pressure, aspiration on old coffee ground blood, bloods could not be drawn, pt unresponsive
Pt has no current distress.
Pt is currently not in any distress.
pt diaphoretic, poor respirations. non verbal, vss unstable

## 2017-12-09 NOTE — CONSULT NOTE ADULT - CONSULT REQUESTED DATE/TIME
02-Dec-2017 07:32
02-Dec-2017 10:26
09-Dec-2017 03:30
29-Nov-2017
29-Nov-2017 00:40
30-Nov-2017
28-Nov-2017 22:32
05-Dec-2017 13:25

## 2017-12-09 NOTE — PROVIDER CONTACT NOTE (OTHER) - ACTION/TREATMENT ORDERED:
Will follow up with RN
Chest PT was recommended through specialty bed settings and NT suctioning x1
MD came to assess pt. BS was drawn, vitals were retaken.
No action ordered at this time, to follow up in am.
Pt able to tolerate diet according to MD. Diet to be changed to pureed. KFT to be left in place at this time.
pushed epi, pushed Amlodipine, shocked the pt, checked pulse, cpr done, intubated  ran an entire code

## 2017-12-09 NOTE — DISCHARGE NOTE FOR THE EXPIRED PATIENT - HOSPITAL COURSE
89 year old woman with severe dementia and kidney disease presented to ED wtih left facial swelling, lethargy, noisy breathing, and leukocytosis.  Pt was found to have left parotitis and on airway exam had significant supragottic edema.  She was admitted to SICU for airway watch where her airway edema resolved.  She continues medical management of her sialadenitis which is steadily improving.  She was found to have MRSA positive blood cultures for which ID is following.  The patient is full code per the families wishes    MRSA Bacteremia 2/2 parotitis   - ID Dr Blanchard consulted  - ENT consulted  -  cont parotid and submandibular massage, warm compresses q2-3 hrs  - Vancomycin 750 IV q48 hrs  - BCx  &  - NTD   - Weekly CBC, BMP, vanco through  - No ID objection to PICC  - Leukocytosis--> repeat CT Neck - Asymmetric enlargement of the left parotid gland with local regional fatty reticulation and soft tissue swelling consistent with parotitis and left facial cellulitis.  --s/p PICC line placement     CKD  - Continue with fluids, continue to trend  - pt on NaHCO3 at home c/w inpt as prescribed  - Not a dialysis candidate    Sacral Decubitus  - Palliative consulted for pain management  -  Start Ativan 0.25mg IV Q6h, PRN for anxiety  - Dilaudid 0.2mg IV Q3h, PRN for pain management   - Wound care to sacral decubitus -done      : Notified by RN patient is diaphoretic and cold.  Pt seen and evaluated at bedside.  Pt is non-verbal and non-responsive per baseline.  B/L radial, carotid, femoral pulses absent.  No rise of chest noted.  Code blue called. CPR was started and 6 rounds of epinephrine was given , 1x dose of calcium carbonate and 1x dose of amiodarone given with no response.  Pt intubated by anesthesia.  Pt was pronounced  at 0312.  Health care proxy, Caron (Daughter) notified and would not like an autopsy performed.  Cause of death likely multi-organ failure caused by bacteremia .  A-team hospitalist notified as well 89 year old woman with severe dementia and kidney disease presented to ED wtih left facial swelling, lethargy, noisy breathing, and leukocytosis.  Pt was found to have left parotitis and on airway exam had significant supragottic edema.  She was admitted to SICU for airway watch where her airway edema resolved.  She continued medical management of her sialadenitis with IV abx and warm compresses.  She was also found to have MRSA positive blood cultures and ID was consulted.  The patient is full code per the families wishes. Ethics involved in SICU.    MRSA Bacteremia 2/2 parotitis   - ID Dr Blanchard consulted  - ENT consulted  -  cont parotid and submandibular massage, warm compresses q2-3 hrs  - Vancomycin 750 IV q48 hrs  - BCx  &  - NTD   - Weekly CBC, BMP, vanco through  - No ID objection to PICC  - Leukocytosis--> repeat CT Neck - Asymmetric enlargement of the left parotid gland with local regional fatty reticulation and soft tissue swelling consistent with parotitis and left facial cellulitis.  --s/p PICC line placement     CKD  -- pt on NaHCO3 at home c/w inpt as prescribed  - Not a dialysis candidate    Sacral Decubitus  - Palliative consulted for pain management and MOLST/ GOC  - Wound care to stage 2 sacral decubitus       : Notified by RN patient is diaphoretic and cold.  Pt seen and evaluated at bedside.  Pt is non-verbal and non-responsive per baseline.  B/L radial, carotid, femoral pulses absent.  No rise of chest noted.  Code blue called. CPR was started and 6 rounds of epinephrine was given , 1x dose of calcium carbonate and 1x dose of amiodarone given with no response.  Pt intubated by anesthesia.  Pt was pronounced  at 0312.  Health care proxy, Caron (Daughter) notified and would not like an autopsy performed.  Cause of death likely multi-organ failure caused by bacteremia .  A-team hospitalist notified as well

## 2020-04-19 NOTE — DISCHARGE NOTE ADULT - HAS THE PATIENT RECEIVED THE INFLUENZA VACCINE DURING THIS VISIT
Apixaban/Eliquis - Follow up monitoring/Apixaban/Eliquis - Dietary Advice/Apixaban/Eliquis - Compliance/Apixaban/Eliquis - Potential for adverse drug reactions and interactions No
